# Patient Record
Sex: FEMALE | Race: WHITE | NOT HISPANIC OR LATINO | Employment: OTHER | URBAN - METROPOLITAN AREA
[De-identification: names, ages, dates, MRNs, and addresses within clinical notes are randomized per-mention and may not be internally consistent; named-entity substitution may affect disease eponyms.]

---

## 2021-03-01 LAB — HBA1C MFR BLD HPLC: 6.3 %

## 2021-05-07 ENCOUNTER — OFFICE VISIT (OUTPATIENT)
Dept: ENDOCRINOLOGY | Facility: HOSPITAL | Age: 81
End: 2021-05-07
Payer: MEDICARE

## 2021-05-07 VITALS
WEIGHT: 168.4 LBS | HEART RATE: 70 BPM | DIASTOLIC BLOOD PRESSURE: 72 MMHG | BODY MASS INDEX: 30.99 KG/M2 | HEIGHT: 62 IN | SYSTOLIC BLOOD PRESSURE: 120 MMHG

## 2021-05-07 DIAGNOSIS — E21.3 HYPERPARATHYROIDISM (HCC): ICD-10-CM

## 2021-05-07 DIAGNOSIS — E83.52 HYPERCALCEMIA: Primary | ICD-10-CM

## 2021-05-07 PROCEDURE — 99204 OFFICE O/P NEW MOD 45 MIN: CPT | Performed by: INTERNAL MEDICINE

## 2021-05-07 RX ORDER — VITAMIN B COMPLEX
1 CAPSULE ORAL DAILY
COMMUNITY

## 2021-05-07 RX ORDER — LOSARTAN POTASSIUM AND HYDROCHLOROTHIAZIDE 12.5; 1 MG/1; MG/1
1 TABLET ORAL DAILY
COMMUNITY
Start: 2021-02-25

## 2021-05-07 RX ORDER — ATORVASTATIN CALCIUM 40 MG/1
40 TABLET, FILM COATED ORAL DAILY
COMMUNITY
Start: 2021-03-11

## 2021-05-07 RX ORDER — ALBUTEROL SULFATE 90 UG/1
2 AEROSOL, METERED RESPIRATORY (INHALATION) EVERY 6 HOURS PRN
COMMUNITY

## 2021-05-07 RX ORDER — FAMOTIDINE 40 MG/1
40 TABLET, FILM COATED ORAL
COMMUNITY
Start: 2021-03-22

## 2021-05-07 RX ORDER — ESOMEPRAZOLE MAGNESIUM 40 MG/1
40 CAPSULE, DELAYED RELEASE ORAL DAILY
COMMUNITY
Start: 2021-03-15

## 2021-05-07 NOTE — PATIENT INSTRUCTIONS
Let's repeat the blood work with some extra blood work since you are off calcium now  I'll try to get the dexa scan  Drink plenty of water  We may need to do more testing after this blood work    Follow up to be determined

## 2021-05-07 NOTE — PROGRESS NOTES
5/7/2021    Assessment/Plan      Diagnoses and all orders for this visit:    Hypercalcemia  -     Comprehensive metabolic panel Lab Collect  -     Phosphorus Lab Collect  -     Magnesium Lab Collect  -     PTH, intact Lab Collect Lab Collect  -     Vitamin D 25 hydroxy Lab Collect  -     TSH, 3rd generation Lab Collect  -     T4, free Lab Collect    Hyperparathyroidism (HCC)  -     Comprehensive metabolic panel Lab Collect  -     Phosphorus Lab Collect  -     Magnesium Lab Collect  -     PTH, intact Lab Collect Lab Collect  -     Vitamin D 25 hydroxy Lab Collect  -     TSH, 3rd generation Lab Collect  -     T4, free Lab Collect    Other orders  -     esomeprazole (NexIUM) 40 MG capsule; Take 40 mg by mouth daily   -     losartan-hydrochlorothiazide (HYZAAR) 100-12 5 MG per tablet; Take 1 tablet by mouth daily   -     b complex vitamins capsule; Take 1 capsule by mouth daily  -     Breo Ellipta 200-25 MCG/INH inhaler; Inhale 1 puff daily   -     famotidine (PEPCID) 40 MG tablet; Take 40 mg by mouth daily at bedtime   -     atorvastatin (LIPITOR) 40 mg tablet; Take 40 mg by mouth daily   -     albuterol (PROVENTIL HFA,VENTOLIN HFA) 90 mcg/act inhaler; Inhale 2 puffs every 6 (six) hours as needed for wheezing        Assessment/Plan:    1  Hypercalcemia with hyperparathyroidism  She did have mild hypercalcemia with an elevated parathyroid hormone level  Calcium tablets have since been discontinued  She has not had a vitamin-D level checked so I will do some blood work now  Depending on what the blood work says, we may have to further evaluate the parathyroid by doing a 24 hour urine, renal ultrasound, and perhaps a sestamibi scan  I will try to get her previous DEXA scans from [facility]       Of note, I have reassured her that her left-sided ear symptoms are not related to her parathyroid or her thyroid  I believe it is more related to the sternocleidomastoid muscle    She could also have wax  Buildup in her ear causing her decrease in hearing and ear clogging sensation  She will get blood work done now consisting of a CMP, phosphorus, magnesium, 25 hydroxy vitamin-D level, intact parathyroid hormone level, TSH, and free T4       CC:   Hypercalcemia, hyperparathyroidism consult    History of Present Illness     HPI: Ene Russell is a 78y o  year old female with history of  Hypercalcemia with elevated parathyroid hormone level here for evaluation /consult  She reports that she was found on routine blood work by her primary care physician to have an elevated calcium level  She was told to follow-up with endocrinology  Of note, she was on calcium tablets that were discontinued several months ago because of the elevated calcium  She has a history of being on a multivitamin / Peyman Racer tall but ran out last week  She has never been on vitamin-D replacement  She does have a history of breast cancer and was on Prolia injections along with an estrogen receptor blocker up until February 2021 which were both discontinued by her oncologist   She has reportedly had a DEXA scan showing osteopenia/ osteoporosis but I do not have those results  She denies height loss or renal stones  She does not get outside that much in the sun other than working in the garden the nice weather  She does not exercise as she uses a cane to walk  Dietary calcium intake includes no milk or yogurt, very little cheese, and occasional ice cream or dark vegetables  She has polyuria but no polydipsia  She denies nocturia  She denies constipation or heartburn  She denies fatigue  She denies mental confusion  She denies back pain  She denies perioral paresthesias or extremity paresthesias  She denies muscle weakness  She reports that she is postmenopausal but had a hysterectomy at a young age  She was never on estrogen replacement therapy  She has never been on steroids      Of note, she also complains of left-sided ear pain, it is primarily posterior tear here in radiates down her neck right along her sternocleidomastoid muscle  She does sometimes have some decrease in hearing in that left ear and it feels clogged at times  Review of Systems   Constitutional: Negative for fatigue and unexpected weight change  Will fall asleep if idle  HENT: Positive for ear pain and hearing loss  Negative for tinnitus and trouble swallowing  No XRT to the head or neck in the past    Eyes: Negative for visual disturbance  Wears glasses  No diplopia  Respiratory: Positive for shortness of breath  Negative for chest tightness  SOB with walking fast    Cardiovascular: Positive for leg swelling  Negative for chest pain and palpitations  Right leg edema, wears compression socks  Gastrointestinal: Negative for abdominal pain, constipation, diarrhea and nausea  No heartburn with meds  Endocrine: Positive for polyuria  Negative for polydipsia  No nocturia  Occasional polyuria  Genitourinary:        Postmenopausal, had hysterectomy about 50 years ago  Was on 5 years estrogen blocker for breast cancer and recently discontinued  Musculoskeletal: Negative for arthralgias and back pain  Uses a cane to walk  Skin: Negative for rash and wound  Neurological: Positive for dizziness and headaches  Negative for tremors, weakness, light-headedness and numbness  No numbness or tingling around the mouth  Occasionally leg gives out if walks too fast  Rare headache  History of dizzy spells not recently  Psychiatric/Behavioral: Negative for confusion, dysphoric mood and sleep disturbance  The patient is not nervous/anxious          Historical Information   Past Medical History:   Diagnosis Date    Breast cancer ([facility] [address] )     right, did XRT and surgery and took 5 years of estrogen blocker    CKD (chronic kidney disease) stage 3, GFR 30-59 ml/min (HCC)     Colon polyps     COPD (chronic obstructive pulmonary disease) (HCC)     Fatty liver     GERD (gastroesophageal reflux disease)     Hyperlipidemia     Hypertension     Lymphedema of arm     right    Osteoporosis     was on prolia and now off it per Dr Juliane Miller  Past Surgical History:   Procedure Laterality Date    BREAST LUMPECTOMY Right     with AND    PARTIAL HYSTERECTOMY       Social History   Social History     Substance and Sexual Activity   Alcohol Use Never    Frequency: Never     Social History     Substance and Sexual Activity   Drug Use Never     Social History     Tobacco Use   Smoking Status Former Smoker    Types: Cigarettes    Quit date: 2011    Years since quitting: 10 3   Smokeless Tobacco Never Used     Family History:   Family History   Problem Relation Age of Onset    Heart disease Mother     Stroke Mother     Hypertension Mother     Colon cancer Father     Diabetes type II Sister     Hypertension Sister     Hyperlipidemia Sister     Heart disease Brother     Heart disease Daughter     No Known Problems Son     No Known Problems Son     No Known Problems Son     No Known Problems Son     No Known Problems Brother     Mental retardation Brother        Meds/Allergies   Current Outpatient Medications   Medication Sig Dispense Refill    albuterol (PROVENTIL HFA,VENTOLIN HFA) 90 mcg/act inhaler Inhale 2 puffs every 6 (six) hours as needed for wheezing      atorvastatin (LIPITOR) 40 mg tablet Take 40 mg by mouth daily       b complex vitamins capsule Take 1 capsule by mouth daily      Breo Ellipta 200-25 MCG/INH inhaler Inhale 1 puff daily       esomeprazole (NexIUM) 40 MG capsule Take 40 mg by mouth daily       famotidine (PEPCID) 40 MG tablet Take 40 mg by mouth daily at bedtime       losartan-hydrochlorothiazide (HYZAAR) 100-12 5 MG per tablet Take 1 tablet by mouth daily        No current facility-administered medications for this visit        Allergies   Allergen Reactions    Pollen Extract Other (See Comments)       Objective   Vitals: Blood pressure 120/72, pulse 70, height 5' 2" (1 575 m), weight 76 4 kg (168 lb 6 4 oz)  Invasive Devices     None                 Physical Exam  Vitals signs reviewed  Constitutional:       Appearance: Normal appearance  She is well-developed  HENT:      Head: Normocephalic and atraumatic  Eyes:      Extraocular Movements: Extraocular movements intact  Conjunctiva/sclera: Conjunctivae normal       Comments:  No lid lag, stare, proptosis, or periorbital edema  Neck:      Musculoskeletal: Normal range of motion and neck supple  Thyroid: No thyromegaly  Vascular: No carotid bruit  Comments: Tender over the left SCM muscle  Thyroid normal in size without palpable thyroid nodules  No masses of the neck  No bruits over the thyroid gland  Cardiovascular:      Rate and Rhythm: Normal rate and regular rhythm  Heart sounds: Normal heart sounds  No murmur  Pulmonary:      Effort: Pulmonary effort is normal       Breath sounds: Normal breath sounds  No wheezing  Abdominal:      General: Bowel sounds are normal       Palpations: Abdomen is soft  Tenderness: There is no abdominal tenderness  Musculoskeletal:         General: No deformity  Right lower leg: No edema  Left lower leg: No edema  Comments:   No tremor of the outstretched hands  No spinous process tenderness  No CVA tenderness  Lymphadenopathy:      Cervical: No cervical adenopathy  Skin:     General: Skin is warm and dry  Findings: No rash  Neurological:      Mental Status: She is alert and oriented to person, place, and time  Deep Tendon Reflexes: Reflexes are normal and symmetric  Comments:   Deep tendon reflexes normal          The history was obtained from the review of the chart and from the patient  Lab Results:        Blood work done on 03/01/2021 showed a calcium of 10 5 with an intact parathyroid hormone level of 193  TSH is 2 21  CMPs over the last year have shown both fluctuations and calcium from 10 5-11 2  Albumin has been stable from 4 2-4 5 during this time  No future appointments

## 2021-05-28 DIAGNOSIS — E21.3 HYPERPARATHYROIDISM (HCC): Primary | ICD-10-CM

## 2021-05-28 DIAGNOSIS — E83.52 HYPERCALCEMIA: ICD-10-CM

## 2021-06-25 ENCOUNTER — TELEPHONE (OUTPATIENT)
Dept: ENDOCRINOLOGY | Facility: HOSPITAL | Age: 81
End: 2021-06-25

## 2021-06-28 ENCOUNTER — OFFICE VISIT (OUTPATIENT)
Dept: ENDOCRINOLOGY | Facility: HOSPITAL | Age: 81
End: 2021-06-28
Payer: MEDICARE

## 2021-06-28 VITALS
WEIGHT: 170 LBS | DIASTOLIC BLOOD PRESSURE: 70 MMHG | HEART RATE: 68 BPM | BODY MASS INDEX: 31.28 KG/M2 | HEIGHT: 62 IN | SYSTOLIC BLOOD PRESSURE: 142 MMHG

## 2021-06-28 DIAGNOSIS — E83.52 HYPERCALCEMIA: ICD-10-CM

## 2021-06-28 DIAGNOSIS — E21.3 HYPERPARATHYROIDISM (HCC): Primary | ICD-10-CM

## 2021-06-28 PROCEDURE — 99214 OFFICE O/P EST MOD 30 MIN: CPT | Performed by: INTERNAL MEDICINE

## 2021-06-28 RX ORDER — CINACALCET 30 MG/1
30 TABLET, FILM COATED ORAL DAILY
Qty: 30 TABLET | Refills: 6 | Status: SHIPPED | OUTPATIENT
Start: 2021-06-28 | End: 2022-04-28

## 2021-06-28 NOTE — PATIENT INSTRUCTIONS
The calcium and parathyroid are very high  Surgery will sure it but needs to be done at [place] or in the [facility] or [address] [facility]  We may be able to control the calcium with a pill, sensipar 30 mg once daily  Call me if the medicine is too expensive  Drink water 8 ounces 6-8 glasses a day  Follow up 3 months with blood work

## 2021-06-28 NOTE — PROGRESS NOTES
6/28/2021    Assessment/Plan      Diagnoses and all orders for this visit:    Hyperparathyroidism ([address] )  -     cinacalcet (SENSIPAR) 30 mg tablet; Take 1 tablet (30 mg total) by mouth daily  -     Comprehensive metabolic panel Lab Collect; Future  -     Phosphorus Lab Collect; Future  -     PTH, intact Lab Collect Lab Collect; Future    Hypercalcemia  -     cinacalcet (SENSIPAR) 30 mg tablet; Take 1 tablet (30 mg total) by mouth daily  -     Comprehensive metabolic panel Lab Collect; Future  -     Phosphorus Lab Collect; Future  -     PTH, intact Lab Collect Lab Collect; Future        Assessment/Plan:   1  Hypercalcemia  She has marked hypercalcemia with a calcium of 11 5 which is over 1 mg/dL above the upper limit of normal   She has minor symptoms such as fatigue and polyuria     The hypercalcemia is on the basis of hyperparathyroidism  2  Hyperparathyroidism  She has hypercalcemia due to hyperparathyroidism with a parathyroid hormone level of 150  Calcium level is 11 5   24 hour urine calcium is normal and ultrasound of the kidneys demonstrates no evidence of renal stones  She does have a history of a worsening DEXA scan  She is due for repeat DEXA scan in the fall 2021  I had a discussion with her regarding her symptomatology, test results and treatment options  The most definitive treatment option is surgical removal of the parathyroid gland  She is not yet ready to undergo surgery at this point with her age and medical conditions  We will try to control her calcium utilizing 6-8 8 oz glasses of water consume to day and Sensipar 30 mg daily  She was asked to call if the Sensipar is too expensive and then we will have to refer her to a surgeon and have a sestamibi scan performed  I have asked her to follow up in 3 months with preceding CMP, phosphorus, and intact parathyroid hormone level        The entirety of the visit was spent in discussion regarding her testing results and treatment options  CC:   Hypercalcemia and hyperparathyroid follow-up    History of Present Illness     HPI: Kyler Membreno is a 78y o  year old female with history of  Hypercalcemia due to hyperparathyroidism he for follow-up visit  She is here to discuss blood work results and treatment options  Review of Systems  was not performed as she was here to discuss blood work results and treatment options  Historical Information   Past Medical History:   Diagnosis Date    Breast cancer ([address] )     right, did XRT and surgery and took 5 years of estrogen blocker    CKD (chronic kidney disease) stage 3, GFR 30-59 ml/min (HCC)     Colon polyps     COPD (chronic obstructive pulmonary disease) (HCC)     Fatty liver     GERD (gastroesophageal reflux disease)     Hyperlipidemia     Hypertension     Lymphedema of arm     right    Osteoporosis     was on prolia and now off it per Dr Trey Khanna       Past Surgical History:   Procedure Laterality Date    BREAST LUMPECTOMY Right     with AND    PARTIAL HYSTERECTOMY       Social History   Social History     Substance and Sexual Activity   Alcohol Use Never     Social History     Substance and Sexual Activity   Drug Use Never     Social History     Tobacco Use   Smoking Status Former Smoker    Types: Cigarettes    Quit date: 2011    Years since quitting: 10 4   Smokeless Tobacco Never Used     Family History:   Family History   Problem Relation Age of Onset    Heart disease Mother    Tomie Coop Stroke Mother     Hypertension Mother     Colon cancer Father     Diabetes type II Sister     Hypertension Sister     Hyperlipidemia Sister     Heart disease Brother     Heart disease Daughter     No Known Problems Son     No Known Problems Son     No Known Problems Son     No Known Problems Son     No Known Problems Brother     Mental retardation Brother        Meds/Allergies   Current Outpatient Medications   Medication Sig Dispense Refill    albuterol (Ganesh Yarsani HFA,VENTOLIN HFA) 90 mcg/act inhaler Inhale 2 puffs every 6 (six) hours as needed for wheezing      atorvastatin (LIPITOR) 40 mg tablet Take 40 mg by mouth daily       b complex vitamins capsule Take 1 capsule by mouth daily      Breo Ellipta 200-25 MCG/INH inhaler Inhale 1 puff daily       esomeprazole (NexIUM) 40 MG capsule Take 40 mg by mouth daily       famotidine (PEPCID) 40 MG tablet Take 40 mg by mouth daily at bedtime       losartan-hydrochlorothiazide (HYZAAR) 100-12 5 MG per tablet Take 1 tablet by mouth daily       cinacalcet (SENSIPAR) 30 mg tablet Take 1 tablet (30 mg total) by mouth daily 30 tablet 6     No current facility-administered medications for this visit  Allergies   Allergen Reactions    Pollen Extract Other (See Comments)       Objective   Vitals: Blood pressure 142/70, pulse 68, height 5' 2" (1 575 m), weight 77 1 kg (170 lb)  Invasive Devices     None                 Physical Exam  was not performed as this visit was to discuss testing results and treatment options  The history was obtained from the review of the chart and from the patient and grandson  Lab Results:        Blood work done on 05/13/2021 at [facility] showed a CMP with a calcium of 11 2, albumin is 4 4, glucose 120, BUN/creatinine ratio 30 7, but was otherwise normal  Phosphorus is 3 5  Magnesium is 1 9  PTH level is 155  Twenty-five hydroxy vitamin-D level is 30 8  TSH is 2 42 with a free T4 of 1 07     24 hour urine calcium is 108  1  Renal ultrasound:     Renal ultrasound performed at [facility] on 06/14/2021 showed no renal calculi, just a small cyst attached to the inferior portion of the left kidney        Future Appointments   Date Time Provider [place]   10/4/2021  1:20 PM Piper Stevenson MD [facility]

## 2021-06-29 ENCOUNTER — TELEPHONE (OUTPATIENT)
Dept: ENDOCRINOLOGY | Facility: HOSPITAL | Age: 81
End: 2021-06-29

## 2021-06-29 NOTE — TELEPHONE ENCOUNTER
Patients Sensipar needs a prior auth       Rx [facility]  [id number]  Rx [address]    ID # [id number]

## 2021-07-26 ENCOUNTER — TELEPHONE (OUTPATIENT)
Dept: ENDOCRINOLOGY | Facility: HOSPITAL | Age: 81
End: 2021-07-26

## 2021-07-26 NOTE — TELEPHONE ENCOUNTER
Blood work shows a normal calcium although it is high normal   The rest of her blood work is normal   Her parathyroid hormone level remains high though at 166 but that is stable

## 2021-08-27 ENCOUNTER — TELEPHONE (OUTPATIENT)
Dept: ENDOCRINOLOGY | Facility: HOSPITAL | Age: 81
End: 2021-08-27

## 2021-08-27 NOTE — TELEPHONE ENCOUNTER
Could she retry the cinicalcet 3 times a week  Make sure to be off the medicine at least 2 weeks before trying  If it still causes these symptoms then we may have to discuss surgery

## 2021-08-27 NOTE — TELEPHONE ENCOUNTER
Loreta Batista called bc she tried the cinacalcet for the month of July and she said they made her sick  She almost took all 30 pills but she had to lay down after she took them with breakfast and they started making her gag  She said they were making her poop a lot too

## 2021-10-04 ENCOUNTER — OFFICE VISIT (OUTPATIENT)
Dept: ENDOCRINOLOGY | Facility: HOSPITAL | Age: 81
End: 2021-10-04
Payer: MEDICARE

## 2021-10-04 VITALS
DIASTOLIC BLOOD PRESSURE: 80 MMHG | HEIGHT: 62 IN | BODY MASS INDEX: 30.77 KG/M2 | WEIGHT: 167.2 LBS | HEART RATE: 82 BPM | SYSTOLIC BLOOD PRESSURE: 148 MMHG

## 2021-10-04 DIAGNOSIS — E83.52 HYPERCALCEMIA: ICD-10-CM

## 2021-10-04 DIAGNOSIS — E21.3 HYPERPARATHYROIDISM (HCC): Primary | ICD-10-CM

## 2021-10-04 PROCEDURE — 99214 OFFICE O/P EST MOD 30 MIN: CPT | Performed by: PHYSICIAN ASSISTANT

## 2022-02-17 ENCOUNTER — TELEPHONE (OUTPATIENT)
Dept: ENDOCRINOLOGY | Facility: HOSPITAL | Age: 82
End: 2022-02-17

## 2022-02-17 NOTE — TELEPHONE ENCOUNTER
I would like to know this information first  But yes she likely needs a follow up appointment to discuss

## 2022-02-18 ENCOUNTER — TELEPHONE (OUTPATIENT)
Dept: HEMATOLOGY ONCOLOGY | Facility: CLINIC | Age: 82
End: 2022-02-18

## 2022-02-18 NOTE — TELEPHONE ENCOUNTER
Pt called back  She has not been taking the Sensipar because it was making her sick, nauseous  She said she finished 1 bottle and then stopped taking so she has not been taking it since last summer

## 2022-02-18 NOTE — TELEPHONE ENCOUNTER
New Patient Intake Form   Patient Details:    Miguel Lawton  1940  [id number]    Appointment Information   Who is calling to schedule? Patient   If not self, what is the caller's name? Please put name of [facility] nurse as well  Referring provider Evelyn Vela MD   What is the diagnosis? Hyperparathyroidism     Is there a confirmed tissue diagnosis? No   Is there a biopsy ordered or pending? Please specify dates   n/a     Is patient aware of diagnosis? Yes   Have you had any imaging or labs done? If yes, where? (If imaging done outside of [facility], please remind patient to bring a disk )   Yes     02/16     If imaging done at outside facility, did you instruct patient to obtain discs and bring to visit? n/a   Have you been seen by another Oncologist/Hematologist?  If so, who and where? no   Are the records in [facility] or [facility] Everywhere? yes   Does the patient have records at another facility/hospital? no   If yes, Name of facility, city and state where facility is located  Did you instruct patient to have records faxed to [facility] and provide [facility] number? n/a   Preferred [place]   Is the patient willing to be seen by another provider?   (This is for breast patients only) no   Miscellaneous Information: appt made for 03/25

## 2022-02-18 NOTE — TELEPHONE ENCOUNTER
If she can not tolerate the sensipar then we need to get her to a parathyroid surgeon as the calcium is going up very high and she is at risk for coma

## 2022-03-25 ENCOUNTER — CONSULT (OUTPATIENT)
Dept: SURGICAL ONCOLOGY | Facility: CLINIC | Age: 82
End: 2022-03-25
Payer: COMMERCIAL

## 2022-03-25 VITALS
TEMPERATURE: 97 F | WEIGHT: 155.4 LBS | BODY MASS INDEX: 28.6 KG/M2 | DIASTOLIC BLOOD PRESSURE: 82 MMHG | SYSTOLIC BLOOD PRESSURE: 134 MMHG | HEIGHT: 62 IN

## 2022-03-25 DIAGNOSIS — E83.52 HYPERCALCEMIA: ICD-10-CM

## 2022-03-25 DIAGNOSIS — E21.3 HYPERPARATHYROIDISM (HCC): Primary | ICD-10-CM

## 2022-03-25 PROCEDURE — 99203 OFFICE O/P NEW LOW 30 MIN: CPT | Performed by: SURGERY

## 2022-03-25 NOTE — LETTER
March 25, 2022     [facility]  [address] [address],[address]  [address] [address] [address]    Patient: Tyler Aragon   YOB: 1940   Date of Visit: 3/25/2022       Dear Dr Maxx Vizcarra:    Thank you for referring Tyler Aragon to me for evaluation  Below are my notes for this consultation  If you have questions, please do not hesitate to call me  I look forward to following your patient along with you  Sincerely,        Sathya Menard MD        CC: DO Shahana Gautam PA-C Ruddy Sick, MD  3/25/2022 11:19 AM  Sign when Signing Visit               Surgical Oncology Consult       [address] SURGICAL ONCOLOGY [facility] [address] [address]    Tyler Aragon  [birth year]  [id number]  [facility]  CANCER CARE [facility] SURGICAL ONCOLOGY [facility] [address] [address]    No chief complaint on file  Assessment/Plan:    No problem-specific Assessment & Plan notes found for this encounter  Diagnoses and all orders for this visit:    Hyperparathyroidism [facility])    Hypercalcemia        Advance Care Planning/Advance Directives:  Discussed disease status, cancer treatment plans and/or cancer treatment goals with the patient  Oncology History    No history exists  History of Present Illness: 78year old woman with hypercalcemia, started on sensipar, though she didn't tolerate well  She has been referred for evaluation and treatment  She denies fatigue, achiness, or GI complaints  Review of Systems   Constitutional: Negative  HENT: Negative  Eyes: Negative  Respiratory: Negative  Cardiovascular: Negative  Gastrointestinal: Negative  Endocrine: Negative  Genitourinary: Negative  Musculoskeletal: Negative  Skin: Negative  Allergic/Immunologic: Negative  Neurological: Negative  Hematological: Negative  Psychiatric/Behavioral: Negative  Patient Active Problem List   Diagnosis    Hypercalcemia    Hyperpar[facility]roidism ([facility] [address] )     Past Medical History:   Diagnosis Date    [facility]     right, did XRT and surgery and took 5 years of estrogen blocker    CKD (chronic kidney disease) stage 3, GFR 30-59 ml/min (HCC)     Colon polyps     COPD (chronic obstructive pulmonary disease) (HCC)     Fatty liver     GERD (gastroesophageal reflux disease)     Hyperlipidemia     Hypertension     Lymphedema of arm     right    Osteoporosis     was on prolia and now off it per Dr Alyson Rojo  Past Surgical History:   Procedure Laterality Date    BREAST LUMPECTOMY Right     with AND    PARTIAL HYSTERECTOMY       Family History   Problem Relation Age of Onset    Heart disease Mother    Karthik Angulo Stroke Mother     Hypertension Mother     Colon cancer Father     Diabetes type II Sister     Hypertension Sister     Hyperlipidemia Sister     Heart disease Brother     Heart disease Daughter     No Known Problems Son     No Known Problems Son     No Known Problems Son     No Known Problems Son     No Known Problems Brother     Mental retardation Brother      Social History     Socioeconomic History    Marital status:       Spouse name: Not on file    Number of children: Not on file    Years of education: Not on file    Highest education level: Not on file   Occupational History    Not on file   Tobacco Use    Smoking status: Former Smoker     Types: Cigarettes     Quit date:      Years since quittin 2    Smokeless tobacco: Never Used   Vaping Use    Vaping Use: Never used   Substance and Sexual Activity    Alcohol use: Never    Drug use: Never    Sexual activity: Not on file   Other Topics Concern    Not on file   Social History Narrative    Not on file     Social Determinants of Health     Financial Resource Strain: Not on file   Food Insecurity: Not on file   Transportation Needs: Not on file   Physical Activity: Not on file   Stress: Not on file   Social Connections: Not on file   Intimate Partner Violence: Not on file   Housing Stability: Not on file       Current Outpatient Medications:     albuterol (PROVENTIL HFA,VENTOLIN HFA) 90 mcg/act inhaler, Inhale 2 puffs every 6 (six) hours as needed for wheezing, Disp: , Rfl:     atorvastatin (LIPITOR) 40 mg tablet, Take 40 mg by mouth daily , Disp: , Rfl:     b complex vitamins capsule, Take 1 capsule by mouth daily, Disp: , Rfl:     Breo Ellipta 200-25 MCG/INH inhaler, Inhale 1 puff daily , Disp: , Rfl:     cinacalcet (SENSIPAR) 30 mg tablet, Take 1 tablet (30 mg total) by mouth daily, Disp: 30 tablet, Rfl: 6    esomeprazole (NexIUM) 40 MG capsule, Take 40 mg by mouth daily , Disp: , Rfl:     famotidine (PEPCID) 40 MG tablet, Take 40 mg by mouth daily at bedtime , Disp: , Rfl:     losartan-hydrochlorothiazide (HYZAAR) 100-12 5 MG per tablet, Take 1 tablet by mouth daily , Disp: , Rfl:   Allergies   Allergen Reactions    Pollen Extract Other (See Comments)     Vitals:    03/25/22 1055   BP: 134/82   Temp: (!) 97 °F (36 1 °C)       Physical Exam  Constitutional:       Appearance: Normal appearance  HENT:      Head: Normocephalic and atraumatic  Right Ear: External ear normal       Left Ear: External ear normal       Nose: Nose normal    Eyes:      Extraocular Movements: Extraocular movements intact  Pupils: Pupils are equal, round, and reactive to light  Cardiovascular:      Rate and Rhythm: Normal rate and regular rhythm  Heart sounds: Normal heart sounds  Pulmonary:      Effort: Pulmonary effort is normal       Breath sounds: Normal breath sounds  Abdominal:      General: Abdomen is flat  Palpations: Abdomen is soft  Musculoskeletal:      Cervical back: Normal range of motion and neck supple  Skin:     General: Skin is warm and dry  Neurological:      General: No focal deficit present        Mental Status: She is alert and oriented to person, place, and time  Psychiatric:         Mood and Affect: Mood normal          Behavior: Behavior normal          Pathology:  none    Labs: 2/16/22  Ca: 12 1  PTH: 103    Imaging  No results found  I reviewed the above laboratory and imaging data  Discussion/Summary: Primary hyperparathyroidism  Will schedule for Sestamibi in anticipation for parathyroidectomy  All questions answered

## 2022-03-25 NOTE — PROGRESS NOTES
Surgical Oncology Consult       [facility] SURGICAL ONCOLOGY [facility] [address]    Denise Gastelum  0/53/8196  [id number]  [address] [facility]  [address] [address]    No chief complaint on file  Assessment/Plan:    No problem-specific Assessment & Plan notes found for this encounter  Diagnoses and all orders for this visit:    Hyperparathyroidism [facility])    Hypercalcemia        Advance Care Planning/Advance Directives:  Discussed disease status, cancer treatment plans and/or cancer treatment goals with the patient  Oncology History    No history exists  History of Present Illness: 78year old woman with hypercalcemia, started on sensipar, though she didn't tolerate well  She has been referred for evaluation and treatment  She denies fatigue, achiness, or GI complaints  Review of Systems   Constitutional: Negative  HENT: Negative  Eyes: Negative  Respiratory: Negative  Cardiovascular: Negative  Gastrointestinal: Negative  Endocrine: Negative  Genitourinary: Negative  Musculoskeletal: Negative  Skin: Negative  Allergic/Immunologic: Negative  Neurological: Negative  Hematological: Negative  Psychiatric/Behavioral: Negative  Patient Active Problem List   Diagnosis    Hypercalcemia    Hyperparathyroidism ([address] )     Past Medical History:   Diagnosis Date    Breast cancer ([address] )     right, did XRT and surgery and took 5 years of estrogen blocker    CKD (chronic kidney disease) stage 3, GFR 30-59 ml/min (HCC)     Colon polyps     COPD (chronic obstructive pulmonary disease) (HCC)     Fatty liver     GERD (gastroesophageal reflux disease)     Hyperlipidemia     Hypertension     Lymphedema of arm     right    Osteoporosis     was on prolia and now off it per Dr Nowell Brunner       Past Surgical History: Procedure Laterality Date    BREAST LUMPECTOMY Right     with AND    PARTIAL HYSTERECTOMY       Family History   Problem Relation Age of Onset    Heart disease Mother    [place] Stroke Mother     Hypertension Mother     Colon cancer Father     Diabetes type II Sister     Hypertension Sister     Hyperlipidemia Sister     Heart disease Brother     Heart disease Daughter     No Known Problems Son     No Known Problems Son     No Known Problems Son     No Known Problems Son     No Known Problems Brother     Mental retardation Brother      Social History     Socioeconomic History    Marital status:       Spouse name: Not on file    Number of children: Not on file    Years of education: Not on file    Highest education level: Not on file   Occupational History    Not on file   Tobacco Use    Smoking status: Former Smoker     Types: Cigarettes     Quit date:      Years since quittin 2    Smokeless tobacco: Never Used   Vaping Use    Vaping Use: Never used   Substance and Sexual Activity    Alcohol use: Never    Drug use: Never    Sexual activity: Not on file   Other Topics Concern    Not on file   Social History Narrative    Not on file     Social Determinants of Health     Financial Resource Strain: Not on file   Food Insecurity: Not on file   Transportation Needs: Not on file   Physical Activity: Not on file   Stress: Not on file   Social Connections: Not on file   Intimate Partner Violence: Not on file   Housing Stability: Not on file       Current Outpatient Medications:     albuterol (PROVENTIL HFA,VENTOLIN HFA) 90 mcg/act inhaler, Inhale 2 puffs every 6 (six) hours as needed for wheezing, Disp: , Rfl:     atorvastatin (LIPITOR) 40 mg tablet, Take 40 mg by mouth daily , Disp: , Rfl:     b complex vitamins capsule, Take 1 capsule by mouth daily, Disp: , Rfl:     Breo Ellipta 200-25 MCG/INH inhaler, Inhale 1 puff daily , Disp: , Rfl:     cinacalcet (SENSIPAR) 30 mg tablet, Take 1 tablet (30 mg total) by mouth daily, Disp: 30 tablet, Rfl: 6    esomeprazole (NexIUM) 40 MG capsule, Take 40 mg by mouth daily , Disp: , Rfl:     famotidine (PEPCID) 40 MG tablet, Take 40 mg by mouth daily at bedtime , Disp: , Rfl:     losartan-hydrochlorothiazide (HYZAAR) 100-12 5 MG per tablet, Take 1 tablet by mouth daily , Disp: , Rfl:   Allergies   Allergen Reactions    Pollen Extract Other (See Comments)     Vitals:    03/25/22 1055   BP: 134/82   Temp: (!) 97 °F (36 1 °C)       Physical Exam  Constitutional:       Appearance: Normal appearance  HENT:      Head: Normocephalic and atraumatic  Right Ear: External ear normal       Left Ear: External ear normal       Nose: Nose normal    Eyes:      Extraocular Movements: Extraocular movements intact  Pupils: Pupils are equal, round, and reactive to light  Cardiovascular:      Rate and Rhythm: Normal rate and regular rhythm  Heart sounds: Normal heart sounds  Pulmonary:      Effort: Pulmonary effort is normal       Breath sounds: Normal breath sounds  Abdominal:      General: Abdomen is flat  Palpations: Abdomen is soft  Musculoskeletal:      Cervical back: Normal range of motion and neck supple  Skin:     General: Skin is warm and dry  Neurological:      General: No focal deficit present  Mental Status: She is alert and oriented to person, place, and time  Psychiatric:         Mood and Affect: Mood normal          Behavior: Behavior normal          Pathology:  none    Labs: 2/16/22  Ca: 12 1  PTH: 103    Imaging  No results found  I reviewed the above laboratory and imaging data  Discussion/Summary: Primary hyperparathyroidism  Will schedule for Sestamibi in anticipation for parathyroidectomy  All questions answered

## 2022-03-29 ENCOUNTER — TELEPHONE (OUTPATIENT)
Dept: HEMATOLOGY ONCOLOGY | Facility: CLINIC | Age: 82
End: 2022-03-29

## 2022-03-29 NOTE — TELEPHONE ENCOUNTER
Appointment Confirmation (to confirm pre existing appointments - ONLY)     Appointment with  Dr Felisha Esposito   Appointment date & time  4/20/22 2:15 pm   Location [place]   Patient verbilized Understanding  yes

## 2022-04-05 ENCOUNTER — HOSPITAL ENCOUNTER (OUTPATIENT)
Dept: NUCLEAR MEDICINE | Facility: HOSPITAL | Age: 82
Discharge: HOME | End: 2022-04-05
Attending: SURGERY
Payer: COMMERCIAL

## 2022-04-05 DIAGNOSIS — E21.3 HYPERPARATHYROIDISM (HCC): ICD-10-CM

## 2022-04-05 PROCEDURE — 78071 PARATHYRD PLANAR W/WO SUBTRJ: CPT

## 2022-04-05 PROCEDURE — A9500 TC99M SESTAMIBI: HCPCS

## 2022-04-05 PROCEDURE — G1004 CDSM NDSC: HCPCS

## 2022-04-19 ENCOUNTER — TELEPHONE (OUTPATIENT)
Dept: SURGICAL ONCOLOGY | Facility: CLINIC | Age: 82
End: 2022-04-19

## 2022-04-22 ENCOUNTER — OFFICE VISIT (OUTPATIENT)
Dept: SURGICAL ONCOLOGY | Facility: CLINIC | Age: 82
End: 2022-04-22
Payer: COMMERCIAL

## 2022-04-22 VITALS
HEART RATE: 66 BPM | SYSTOLIC BLOOD PRESSURE: 138 MMHG | TEMPERATURE: 97.3 F | RESPIRATION RATE: 17 BRPM | DIASTOLIC BLOOD PRESSURE: 64 MMHG | OXYGEN SATURATION: 95 % | WEIGHT: 158.2 LBS | HEIGHT: 62 IN | BODY MASS INDEX: 29.11 KG/M2

## 2022-04-22 DIAGNOSIS — E21.3 HYPERPARATHYROIDISM (HCC): ICD-10-CM

## 2022-04-22 DIAGNOSIS — E83.52 HYPERCALCEMIA: Primary | ICD-10-CM

## 2022-04-22 PROCEDURE — 99214 OFFICE O/P EST MOD 30 MIN: CPT | Performed by: SURGERY

## 2022-04-22 RX ORDER — TRAMADOL HYDROCHLORIDE 50 MG/1
50 TABLET ORAL EVERY 6 HOURS PRN
Qty: 10 TABLET | Refills: 0 | Status: SHIPPED | OUTPATIENT
Start: 2022-04-22 | End: 2022-06-30 | Stop reason: ALTCHOICE

## 2022-04-22 NOTE — PROGRESS NOTES
Surgical Oncology Follow Up       [address] [facility] SURGICAL ONCOLOGY PATIENCEKALIN Kang  [facility] [address]    Sarah Blankenship  0/64/5942  [id number]  [address]  [facility] SURGICAL ONCOLOGY [place]  [address] [address]    Chief Complaint   Patient presents with    Follow-up       Assessment/Plan:    No problem-specific Assessment & Plan notes found for this encounter  Diagnoses and all orders for this visit:    Hypercalcemia    Hyperparathyroidism [facility])        Advance Care Planning/Advance Directives:  Discussed disease status, cancer treatment plans and/or cancer treatment goals with the patient  Oncology History    No history exists  History of Present Illness:  Patient is an 55-year-old woman here for follow-up status post sestamibi scan look for potential parathyroid adenoma   -Interval History:  No new complaints since her last visit  Review of Systems:  Review of Systems   Constitutional: Positive for fatigue  HENT: Negative  Eyes: Negative  Respiratory: Negative  Cardiovascular: Negative  Gastrointestinal: Negative  Endocrine: Negative  Genitourinary: Negative  Musculoskeletal: Positive for arthralgias and myalgias  Skin: Negative  Allergic/Immunologic: Negative  Neurological: Negative  Hematological: Negative  Psychiatric/Behavioral: Negative          Patient Active Problem List   Diagnosis    Hypercalcemia    Hyperparathyroidism [facility])     Past Medical History:   Diagnosis Date    Breast cancer ([address] )     right, did XRT and surgery and took 5 years of estrogen blocker    CKD (chronic kidney disease) stage 3, GFR 30-59 ml/min (HCC)     Colon polyps     COPD (chronic obstructive pulmonary disease) ([address] )     Fatty liver     GERD (gastroesophageal reflux disease)     Hyperlipidemia     Hypertension     Lymphedema of arm     right    Osteoporosis     was on prolia and now off it per Dr Vanessa Hunt  Past Surgical History:   Procedure Laterality Date    BREAST LUMPECTOMY Right     with AND    PARTIAL HYSTERECTOMY       Family History   Problem Relation Age of Onset    Heart disease Mother    [employer] Stroke Mother     Hypertension Mother     Colon cancer Father     Diabetes type II Sister     Hypertension Sister     Hyperlipidemia Sister     Heart disease Brother     Heart disease Daughter     No Known Problems Son     No Known Problems Son     No Known Problems Son     No Known Problems Son     No Known Problems Brother     Mental retardation Brother      Social History     Socioeconomic History    Marital status:       Spouse name: Not on file    Number of children: Not on file    Years of education: Not on file    Highest education level: Not on file   Occupational History    Not on file   Tobacco Use    Smoking status: Former Smoker     Types: Cigarettes     Quit date:      Years since quittin 3    Smokeless tobacco: Never Used   Vaping Use    Vaping Use: Never used   Substance and Sexual Activity    Alcohol use: Never    Drug use: Never    Sexual activity: Not on file   Other Topics Concern    Not on file   Social History Narrative    Not on file     Social Determinants of Health     Financial Resource Strain: Not on file   Food Insecurity: Not on file   Transportation Needs: Not on file   Physical Activity: Not on file   Stress: Not on file   Social Connections: Not on file   Intimate Partner Violence: Not on file   Housing Stability: Not on file       Current Outpatient Medications:     albuterol (PROVENTIL HFA,VENTOLIN HFA) 90 mcg/act inhaler, Inhale 2 puffs every 6 (six) hours as needed for wheezing, Disp: , Rfl:     atorvastatin (LIPITOR) 40 mg tablet, Take 40 mg by mouth daily , Disp: , Rfl:     b complex vitamins capsule, Take 1 capsule by mouth daily, Disp: , Rfl:     Breo Ellipta 200-25 MCG/INH inhaler, Inhale 1 puff daily , Disp: , Rfl:     cinacalcet (SENSIPAR) 30 mg tablet, Take 1 tablet (30 mg total) by mouth daily, Disp: 30 tablet, Rfl: 6    esomeprazole (NexIUM) 40 MG capsule, Take 40 mg by mouth daily , Disp: , Rfl:     famotidine (PEPCID) 40 MG tablet, Take 40 mg by mouth daily at bedtime , Disp: , Rfl:     losartan-hydrochlorothiazide (HYZAAR) 100-12 5 MG per tablet, Take 1 tablet by mouth daily , Disp: , Rfl:     traMADol (Ultram) 50 mg tablet, Take 1 tablet (50 mg total) by mouth every 6 (six) hours as needed for moderate pain, Disp: 10 tablet, Rfl: 0  Allergies   Allergen Reactions    Pollen Extract Other (See Comments)     Vitals:    04/22/22 0940   BP: 138/64   Pulse: 66   Resp: 17   Temp: (!) 97 3 °F (36 3 °C)   SpO2: 95%       Physical Exam  Vitals reviewed  HENT:      Head: Normocephalic and atraumatic  Right Ear: External ear normal       Left Ear: External ear normal       Nose: Nose normal       Mouth/Throat:      Mouth: Mucous membranes are moist    Eyes:      Extraocular Movements: Extraocular movements intact  Pupils: Pupils are equal, round, and reactive to light  Cardiovascular:      Rate and Rhythm: Normal rate and regular rhythm  Heart sounds: Normal heart sounds  Pulmonary:      Effort: Pulmonary effort is normal       Breath sounds: Normal breath sounds  Abdominal:      General: Abdomen is flat  Palpations: Abdomen is soft  Musculoskeletal:         General: Normal range of motion  Cervical back: Normal range of motion and neck supple  No rigidity or tenderness  Right lower leg: No edema  Left lower leg: No edema  Lymphadenopathy:      Cervical: No cervical adenopathy  Skin:     General: Skin is warm and dry  Neurological:      General: No focal deficit present  Mental Status: She is oriented to person, place, and time  Psychiatric:         Mood and Affect: Mood normal          Behavior: Behavior normal          Thought Content:  Thought content normal          Judgment: Judgment normal            Results:  Labs:  No results found for: PTH, CALCIUM, CAION, PHOS      Imaging  NM parathyroid scan w spect    Result Date: 4/5/2022  Narrative: PARATHYROID SCAN INDICATION:  E21 3: Hyperparathyroidism, unspecified COMPARISON:  None  TECHNIQUE:   Following the intravenous administration of 26 6 mCi Tc-99m Cardiolite, anterior and bilateral anterior oblique projection images of the neck and mediastinum were obtained at approximately 10 minutes post injection followed at 2 hours post injection by static anterior and bilateral oblique projections as well as SPECT images in coronal, sagittal and axial projections  FINDINGS: Immediate imaging demonstrates slightly asymmetric nodular tracer activity in the region of the left upper to mid thyroid lobe  Tracer distribution is otherwise physiologic  Delayed imaging demonstrates near complete washout of tracer activity from the thyroid gland with subtle asymmetric persistent tracer activity about the left upper to mid thyroid lobe which is difficult to definitively anatomically localize on SPECT imaging  While I favor findings to be related to incomplete washout of tracer activity from the thyroid gland, parathyroid adenoma versus thyroid adenoma/carcinoma is within the differential diagnosis  Consider correlation with ultrasound as clinically  indicated  Impression: No focal tracer activity characteristic of parathyroid adenoma  However, note is made of mild asymmetric persistent focus of tracer activity about the left upper to mid thyroid lobe of uncertain clinical significance, see discussion above for differential diagnosis which includes subtle parathyroid adenoma  Consider  correlation with ultrasound as clinically indicated  Workstation performed: [id number]     I reviewed the above laboratory and imaging data      Discussion/Summary:  10-year-old woman, primary hyperparathyroidism, suspected left-sided target based on sestamibi scan  She is amenable to and a candidate for minimally invasive parathyroidectomy, possible 4 gland exploration, with intraoperative PTH monitoring  Rationale for this along with risks and benefits of surgery including infection, bleeding, need for possible additional surgery, discussed with her  All questions answered and consent signed at this visit

## 2022-04-25 ENCOUNTER — TELEPHONE (OUTPATIENT)
Dept: ANESTHESIOLOGY | Facility: CLINIC | Age: 82
End: 2022-04-25

## 2022-04-27 PROBLEM — I10 PRIMARY HYPERTENSION: Status: ACTIVE | Noted: 2022-04-27

## 2022-04-27 PROBLEM — E78.49 OTHER HYPERLIPIDEMIA: Status: ACTIVE | Noted: 2022-04-27

## 2022-04-27 PROBLEM — Z01.89 ENCOUNTER FOR GERIATRIC ASSESSMENT: Status: ACTIVE | Noted: 2022-04-27

## 2022-04-28 ENCOUNTER — HOSPITAL ENCOUNTER (OUTPATIENT)
Dept: RADIOLOGY | Facility: HOSPITAL | Age: 82
Discharge: HOME | End: 2022-04-28
Attending: SURGERY
Payer: COMMERCIAL

## 2022-04-28 ENCOUNTER — CONSULT (OUTPATIENT)
Dept: ANESTHESIOLOGY | Facility: CLINIC | Age: 82
End: 2022-04-28
Payer: COMMERCIAL

## 2022-04-28 ENCOUNTER — APPOINTMENT (OUTPATIENT)
Dept: LAB | Facility: HOSPITAL | Age: 82
End: 2022-04-28
Attending: SURGERY
Payer: COMMERCIAL

## 2022-04-28 VITALS
DIASTOLIC BLOOD PRESSURE: 64 MMHG | OXYGEN SATURATION: 92 % | WEIGHT: 155.6 LBS | BODY MASS INDEX: 28.46 KG/M2 | SYSTOLIC BLOOD PRESSURE: 150 MMHG | TEMPERATURE: 97 F | HEART RATE: 69 BPM | RESPIRATION RATE: 17 BRPM

## 2022-04-28 DIAGNOSIS — E83.52 HYPERCALCEMIA: ICD-10-CM

## 2022-04-28 DIAGNOSIS — I10 HYPERTENSION, UNSPECIFIED TYPE: ICD-10-CM

## 2022-04-28 DIAGNOSIS — R54 FRAIL ELDERLY: ICD-10-CM

## 2022-04-28 DIAGNOSIS — N18.9 CHRONIC KIDNEY DISEASE, UNSPECIFIED CKD STAGE: ICD-10-CM

## 2022-04-28 DIAGNOSIS — E88.09 HYPOALBUMINEMIA: ICD-10-CM

## 2022-04-28 DIAGNOSIS — Z01.89 ENCOUNTER FOR GERIATRIC ASSESSMENT: Primary | ICD-10-CM

## 2022-04-28 DIAGNOSIS — J44.9 COPD WITHOUT EXACERBATION (HCC): ICD-10-CM

## 2022-04-28 DIAGNOSIS — Z01.89 ENCOUNTER FOR GERIATRIC ASSESSMENT: ICD-10-CM

## 2022-04-28 DIAGNOSIS — E78.49 OTHER HYPERLIPIDEMIA: ICD-10-CM

## 2022-04-28 DIAGNOSIS — I10 PRIMARY HYPERTENSION: ICD-10-CM

## 2022-04-28 DIAGNOSIS — J43.8 OTHER EMPHYSEMA (HCC): ICD-10-CM

## 2022-04-28 LAB
ALBUMIN SERPL BCP-MCNC: 3.8 G/DL (ref 3.5–5)
ALP SERPL-CCNC: 125 U/L (ref 46–116)
ALT SERPL W P-5'-P-CCNC: 24 U/L (ref 12–78)
ANION GAP SERPL CALCULATED.3IONS-SCNC: 5 MMOL/L (ref 4–13)
AST SERPL W P-5'-P-CCNC: 19 U/L (ref 5–45)
BASOPHILS # BLD AUTO: 0.05 THOUSANDS/ΜL (ref 0–0.1)
BASOPHILS NFR BLD AUTO: 1 % (ref 0–1)
BILIRUB SERPL-MCNC: 0.65 MG/DL (ref 0.2–1)
BUN SERPL-MCNC: 16 MG/DL (ref 5–25)
CALCIUM SERPL-MCNC: 11.9 MG/DL (ref 8.3–10.1)
CHLORIDE SERPL-SCNC: 103 MMOL/L (ref 100–108)
CO2 SERPL-SCNC: 30 MMOL/L (ref 21–32)
CREAT SERPL-MCNC: 0.8 MG/DL (ref 0.6–1.3)
EOSINOPHIL # BLD AUTO: 0.16 THOUSAND/ΜL (ref 0–0.61)
EOSINOPHIL NFR BLD AUTO: 3 % (ref 0–6)
ERYTHROCYTE [DISTWIDTH] IN BLOOD BY AUTOMATED COUNT: 12.8 % (ref 11.6–15.1)
GFR SERPL CREATININE-BSD FRML MDRD: 68 ML/MIN/1.73SQ M
GLUCOSE SERPL-MCNC: 91 MG/DL (ref 65–140)
HCT VFR BLD AUTO: 40.3 % (ref 34.8–46.1)
HGB BLD-MCNC: 12.3 G/DL (ref 11.5–15.4)
IMM GRANULOCYTES # BLD AUTO: 0.02 THOUSAND/UL (ref 0–0.2)
IMM GRANULOCYTES NFR BLD AUTO: 0 % (ref 0–2)
LYMPHOCYTES # BLD AUTO: 1.33 THOUSANDS/ΜL (ref 0.6–4.47)
LYMPHOCYTES NFR BLD AUTO: 25 % (ref 14–44)
MCH RBC QN AUTO: 27.2 PG (ref 26.8–34.3)
MCHC RBC AUTO-ENTMCNC: 30.5 G/DL (ref 31.4–37.4)
MCV RBC AUTO: 89 FL (ref 82–98)
MONOCYTES # BLD AUTO: 0.56 THOUSAND/ΜL (ref 0.17–1.22)
MONOCYTES NFR BLD AUTO: 11 % (ref 4–12)
NEUTROPHILS # BLD AUTO: 3.13 THOUSANDS/ΜL (ref 1.85–7.62)
NEUTS SEG NFR BLD AUTO: 60 % (ref 43–75)
NRBC BLD AUTO-RTO: 0 /100 WBCS
PLATELET # BLD AUTO: 250 THOUSANDS/UL (ref 149–390)
PMV BLD AUTO: 11.6 FL (ref 8.9–12.7)
POTASSIUM SERPL-SCNC: 4.1 MMOL/L (ref 3.5–5.3)
PREALB SERPL-MCNC: 35.8 MG/DL (ref 18–40)
PROT SERPL-MCNC: 7.3 G/DL (ref 6.4–8.2)
RBC # BLD AUTO: 4.53 MILLION/UL (ref 3.81–5.12)
SODIUM SERPL-SCNC: 138 MMOL/L (ref 136–145)
WBC # BLD AUTO: 5.25 THOUSAND/UL (ref 4.31–10.16)

## 2022-04-28 PROCEDURE — 71046 X-RAY EXAM CHEST 2 VIEWS: CPT

## 2022-04-28 PROCEDURE — 80053 COMPREHEN METABOLIC PANEL: CPT

## 2022-04-28 PROCEDURE — 36415 COLL VENOUS BLD VENIPUNCTURE: CPT

## 2022-04-28 PROCEDURE — 84134 ASSAY OF PREALBUMIN: CPT

## 2022-04-28 PROCEDURE — 85025 COMPLETE CBC W/AUTO DIFF WBC: CPT

## 2022-04-28 PROCEDURE — 99215 OFFICE O/P EST HI 40 MIN: CPT | Performed by: NURSE PRACTITIONER

## 2022-04-28 NOTE — PRE-PROCEDURE INSTRUCTIONS
Pre-Surgery Instructions:   Medication Instructions    albuterol (PROVENTIL HFA,VENTOLIN HFA) 90 mcg/act inhaler Continue to take these inhaler medications on your normal schedule up to and including the day of surgery   atorvastatin (LIPITOR) 40 mg tablet Take night before surgery    b complex vitamins capsule Stop taking 7 days prior to surgery   Breo Ellipta 200-25 MCG/INH inhaler Continue to take these inhaler medications on your normal schedule up to and including the day of surgery   esomeprazole (NexIUM) 40 MG capsule Take day of surgery   famotidine (PEPCID) 40 MG tablet Take night before surgery    losartan-hydrochlorothiazide (HYZAAR) 100-12 5 MG per tablet Hold day of surgery  Have you had / have a sore throat? No  Have you had / have a cough less than 1 week? No  Have you had / have a fever greater than 100 0 - 100  4? No  Are you experiencing any shortness of breath? No  Fully covid vaccinated    Review with patient and her grandson  in person [facility] clinic medications and showering instructions  Instructed to avoid all ASA and OTC Vit/Supp 1 week prior to surgery and to avoid NSAIDs 3 days prior to surgery per anesthesia instructions  Tylenol ok to take prn  [facility] call with surgery schedule time, nothing eat or drink after midnight  Verbalized understanding  See Geriatric Assessment below        Cognitive Assessment: 3   CAM: N/A   TUG <15 sec:No   Falls (last 6 months): No   Hand  score:17  -Attempt 1:16  -Attempt 2:18  -Attempt 3:17   Geovany Total Score: 18   PHQ- 9 Depression Scale:3   Nutrition Assessment Score:14   METS:3 97   Health goals:  -What are your overall health goals? (quit smoking, wt  loss, rest, decrease stress)   Wt Loss  -What brings you strength? (family, friends, Denominational, health)   Friends  -What activities are important to you? (exercise, reading, travel, work)   Gardening

## 2022-05-02 ENCOUNTER — ANESTHESIA EVENT (OUTPATIENT)
Dept: PERIOP | Facility: HOSPITAL | Age: 82
End: 2022-05-02
Payer: COMMERCIAL

## 2022-05-02 ENCOUNTER — TELEPHONE (OUTPATIENT)
Dept: HEMATOLOGY ONCOLOGY | Facility: CLINIC | Age: 82
End: 2022-05-02

## 2022-05-02 NOTE — TELEPHONE ENCOUNTER
Patient states she has to run out for a doctor appt  Patient would just like to know when to stop her medication for upcoming surgery    Please call [phone]

## 2022-05-02 NOTE — TELEPHONE ENCOUNTER
Spoke to patient and answered all questions concerning her upcoming surgery  Patient verbalized understanding and thanks

## 2022-05-02 NOTE — TELEPHONE ENCOUNTER
CALL RETURN FORM   Reason for patient call? Patient is calling in requesting a call back  regarding surgery questions that she has   Patient's primary oncologist?  [address]   Name of person the patient was calling for? Josey   Any additional information to add, if applicable? n/a   Informed patient that the message will be forwarded to the team and someone will get back to them as soon as possible    Did you relay this information to the patient?  Yes, patient can be reached back at [phone]

## 2022-05-13 ENCOUNTER — TELEPHONE (OUTPATIENT)
Dept: SURGICAL ONCOLOGY | Facility: CLINIC | Age: 82
End: 2022-05-13

## 2022-05-23 NOTE — ANESTHESIA PREPROCEDURE EVALUATION
Procedure:  MINIMALLY INVASIVE LEFT PARATHYROIDECTOMY, POSSIBLE 4 GLAND EXPLORATION, INTRAOPERATIVE PTH MONITORING (Left Neck)    Relevant Problems   ANESTHESIA (within normal limits)      CARDIO   (+) Other hyperlipidemia   (+) Primary hypertension      ENDO   (+) Hyperparathyroidism (HCC)      /RENAL   (+) CKD (chronic kidney disease)      GYN   (+) Breast cancer (HCC)      HEMATOLOGY (within normal limits)      NEURO/PSYCH (within normal limits)      PULMONARY   (+) COPD without exacerbation (HCC)      EKG 4/28/2022:  Normal sinus rhythm  Left axis deviation  Left ventricular hypertrophy with repolarization abnormality  Abnormal ECG  No previous ECGs available    Lab Results   Component Value Date    WBC 5 25 04/28/2022    HGB 12 3 04/28/2022    HCT 40 3 04/28/2022    MCV 89 04/28/2022     04/28/2022     Lab Results   Component Value Date    SODIUM 138 04/28/2022    K 4 1 04/28/2022     04/28/2022    CO2 30 04/28/2022    BUN 16 04/28/2022    CREATININE 0 80 04/28/2022    GLUC 91 04/28/2022    CALCIUM 11 9 (H) 04/28/2022     No results found for: INR, PROTIME  Lab Results   Component Value Date    HGBA1C 6 3 03/01/2021          Physical Exam    Airway    Mallampati score: II  TM Distance: >3 FB  Neck ROM: full     Dental   upper dentures and lower dentures,     Cardiovascular  Cardiovascular exam normal    Pulmonary  Pulmonary exam normal     Other Findings        Anesthesia Plan  ASA Score- 3     Anesthesia Type- general with ASA Monitors  Additional Monitors: arterial line  Airway Plan: ETT  Plan Factors-Exercise tolerance (METS): <4 METS  Chart reviewed  EKG reviewed  Existing labs reviewed  Patient summary reviewed  Induction- intravenous  Postoperative Plan-     Informed Consent- Anesthetic plan and risks discussed with patient  I personally reviewed this patient with the CRNA  Discussed and agreed on the Anesthesia Plan with the CRNA  Yazmin Barron

## 2022-05-24 ENCOUNTER — ANESTHESIA (OUTPATIENT)
Dept: PERIOP | Facility: HOSPITAL | Age: 82
End: 2022-05-24
Payer: COMMERCIAL

## 2022-05-24 ENCOUNTER — HOSPITAL ENCOUNTER (OUTPATIENT)
Facility: HOSPITAL | Age: 82
Setting detail: OUTPATIENT SURGERY
Discharge: HOME | End: 2022-05-24
Attending: SURGERY | Admitting: SURGERY
Payer: COMMERCIAL

## 2022-05-24 VITALS
WEIGHT: 155 LBS | TEMPERATURE: 98 F | DIASTOLIC BLOOD PRESSURE: 67 MMHG | BODY MASS INDEX: 28.52 KG/M2 | HEART RATE: 72 BPM | RESPIRATION RATE: 16 BRPM | SYSTOLIC BLOOD PRESSURE: 150 MMHG | HEIGHT: 62 IN | OXYGEN SATURATION: 98 %

## 2022-05-24 DIAGNOSIS — E21.3 HYPERPARATHYROIDISM (HCC): ICD-10-CM

## 2022-05-24 DIAGNOSIS — E83.52 HYPERCALCEMIA: ICD-10-CM

## 2022-05-24 LAB
CALCIUM SERPL-MCNC: 10.3 MG/DL (ref 8.3–10.1)
CALCIUM SERPL-MCNC: 10.3 MG/DL (ref 8.3–10.1)
CALCIUM SERPL-MCNC: 9.6 MG/DL (ref 8.3–10.1)
PTH-INTACT SERPL-MCNC: 149.2 PG/ML (ref 18.4–80.1)
PTH-INTACT SERPL-MCNC: 15.6 PG/ML (ref 18.4–80.1)
PTH-INTACT SERPL-MCNC: 18.5 PG/ML (ref 18.4–80.1)
PTH-INTACT SERPL-MCNC: 26.2 PG/ML (ref 18.4–80.1)

## 2022-05-24 PROCEDURE — 88305 TISSUE EXAM BY PATHOLOGIST: CPT | Performed by: PATHOLOGY

## 2022-05-24 PROCEDURE — 88331 PATH CONSLTJ SURG 1 BLK 1SPC: CPT | Performed by: PATHOLOGY

## 2022-05-24 PROCEDURE — 83970 ASSAY OF PARATHORMONE: CPT | Performed by: SURGERY

## 2022-05-24 PROCEDURE — 82310 ASSAY OF CALCIUM: CPT | Performed by: SURGERY

## 2022-05-24 PROCEDURE — 60500 EXPLORE PARATHYROID GLANDS: CPT | Performed by: SURGERY

## 2022-05-24 PROCEDURE — NC001 PR NO CHARGE: Performed by: SURGERY

## 2022-05-24 RX ORDER — GLYCOPYRROLATE 0.2 MG/ML
INJECTION INTRAMUSCULAR; INTRAVENOUS AS NEEDED
Status: DISCONTINUED | OUTPATIENT
Start: 2022-05-24 | End: 2022-05-24

## 2022-05-24 RX ORDER — NEOSTIGMINE METHYLSULFATE 1 MG/ML
INJECTION INTRAVENOUS AS NEEDED
Status: DISCONTINUED | OUTPATIENT
Start: 2022-05-24 | End: 2022-05-24

## 2022-05-24 RX ORDER — PROPOFOL 10 MG/ML
INJECTION, EMULSION INTRAVENOUS AS NEEDED
Status: DISCONTINUED | OUTPATIENT
Start: 2022-05-24 | End: 2022-05-24

## 2022-05-24 RX ORDER — SODIUM CHLORIDE, SODIUM LACTATE, POTASSIUM CHLORIDE, CALCIUM CHLORIDE 600; 310; 30; 20 MG/100ML; MG/100ML; MG/100ML; MG/100ML
100 INJECTION, SOLUTION INTRAVENOUS CONTINUOUS
Status: DISCONTINUED | OUTPATIENT
Start: 2022-05-24 | End: 2022-05-24 | Stop reason: HOSPADM

## 2022-05-24 RX ORDER — LABETALOL HYDROCHLORIDE 5 MG/ML
INJECTION, SOLUTION INTRAVENOUS AS NEEDED
Status: DISCONTINUED | OUTPATIENT
Start: 2022-05-24 | End: 2022-05-24

## 2022-05-24 RX ORDER — ACETAMINOPHEN 325 MG/1
650 TABLET ORAL EVERY 6 HOURS PRN
Status: DISCONTINUED | OUTPATIENT
Start: 2022-05-24 | End: 2022-05-24 | Stop reason: HOSPADM

## 2022-05-24 RX ORDER — OXYCODONE HYDROCHLORIDE 5 MG/1
5 TABLET ORAL EVERY 4 HOURS PRN
Status: DISCONTINUED | OUTPATIENT
Start: 2022-05-24 | End: 2022-05-24 | Stop reason: HOSPADM

## 2022-05-24 RX ORDER — ROCURONIUM BROMIDE 10 MG/ML
INJECTION, SOLUTION INTRAVENOUS AS NEEDED
Status: DISCONTINUED | OUTPATIENT
Start: 2022-05-24 | End: 2022-05-24

## 2022-05-24 RX ORDER — FENTANYL CITRATE/PF 50 MCG/ML
25 SYRINGE (ML) INJECTION
Status: DISCONTINUED | OUTPATIENT
Start: 2022-05-24 | End: 2022-05-24 | Stop reason: HOSPADM

## 2022-05-24 RX ORDER — BUPIVACAINE HYDROCHLORIDE 5 MG/ML
INJECTION, SOLUTION PERINEURAL AS NEEDED
Status: DISCONTINUED | OUTPATIENT
Start: 2022-05-24 | End: 2022-05-24 | Stop reason: HOSPADM

## 2022-05-24 RX ORDER — SODIUM CHLORIDE, SODIUM LACTATE, POTASSIUM CHLORIDE, CALCIUM CHLORIDE 600; 310; 30; 20 MG/100ML; MG/100ML; MG/100ML; MG/100ML
50 INJECTION, SOLUTION INTRAVENOUS CONTINUOUS
Status: DISCONTINUED | OUTPATIENT
Start: 2022-05-24 | End: 2022-05-24 | Stop reason: HOSPADM

## 2022-05-24 RX ORDER — LIDOCAINE HYDROCHLORIDE 10 MG/ML
INJECTION, SOLUTION EPIDURAL; INFILTRATION; INTRACAUDAL; PERINEURAL AS NEEDED
Status: DISCONTINUED | OUTPATIENT
Start: 2022-05-24 | End: 2022-05-24

## 2022-05-24 RX ORDER — ONDANSETRON 2 MG/ML
4 INJECTION INTRAMUSCULAR; INTRAVENOUS ONCE AS NEEDED
Status: DISCONTINUED | OUTPATIENT
Start: 2022-05-24 | End: 2022-05-24 | Stop reason: HOSPADM

## 2022-05-24 RX ORDER — HYDRALAZINE HYDROCHLORIDE 20 MG/ML
5 INJECTION INTRAMUSCULAR; INTRAVENOUS ONCE
Status: COMPLETED | OUTPATIENT
Start: 2022-05-24 | End: 2022-05-24

## 2022-05-24 RX ORDER — FENTANYL CITRATE 50 UG/ML
INJECTION, SOLUTION INTRAMUSCULAR; INTRAVENOUS AS NEEDED
Status: DISCONTINUED | OUTPATIENT
Start: 2022-05-24 | End: 2022-05-24

## 2022-05-24 RX ORDER — HYDROMORPHONE HCL IN WATER/PF 6 MG/30 ML
0.2 PATIENT CONTROLLED ANALGESIA SYRINGE INTRAVENOUS
Status: DISCONTINUED | OUTPATIENT
Start: 2022-05-24 | End: 2022-05-24 | Stop reason: HOSPADM

## 2022-05-24 RX ORDER — ONDANSETRON 2 MG/ML
INJECTION INTRAMUSCULAR; INTRAVENOUS AS NEEDED
Status: DISCONTINUED | OUTPATIENT
Start: 2022-05-24 | End: 2022-05-24

## 2022-05-24 RX ORDER — DEXAMETHASONE SODIUM PHOSPHATE 10 MG/ML
INJECTION, SOLUTION INTRAMUSCULAR; INTRAVENOUS AS NEEDED
Status: DISCONTINUED | OUTPATIENT
Start: 2022-05-24 | End: 2022-05-24

## 2022-05-24 RX ADMIN — LIDOCAINE HYDROCHLORIDE 50 MG: 10 INJECTION, SOLUTION EPIDURAL; INFILTRATION; INTRACAUDAL at 09:21

## 2022-05-24 RX ADMIN — DEXAMETHASONE SODIUM PHOSPHATE 10 MG: 10 INJECTION INTRAMUSCULAR; INTRAVENOUS at 09:35

## 2022-05-24 RX ADMIN — SODIUM CHLORIDE, SODIUM LACTATE, POTASSIUM CHLORIDE, AND CALCIUM CHLORIDE 50 ML/HR: .6; .31; .03; .02 INJECTION, SOLUTION INTRAVENOUS at 08:22

## 2022-05-24 RX ADMIN — PROPOFOL 140 MG: 10 INJECTION, EMULSION INTRAVENOUS at 09:21

## 2022-05-24 RX ADMIN — Medication 5 MG: at 11:21

## 2022-05-24 RX ADMIN — NEOSTIGMINE METHYLSULFATE 2 MG: 1 INJECTION INTRAVENOUS at 11:10

## 2022-05-24 RX ADMIN — HYDRALAZINE HYDROCHLORIDE 5 MG: 20 INJECTION INTRAMUSCULAR; INTRAVENOUS at 11:59

## 2022-05-24 RX ADMIN — Medication 5 MG: at 11:24

## 2022-05-24 RX ADMIN — FENTANYL CITRATE 50 MCG: 50 INJECTION INTRAMUSCULAR; INTRAVENOUS at 09:21

## 2022-05-24 RX ADMIN — PHENYLEPHRINE HYDROCHLORIDE 10 MCG/MIN: 10 INJECTION INTRAVENOUS at 09:35

## 2022-05-24 RX ADMIN — GLYCOPYRROLATE 0.2 MG: 0.2 INJECTION, SOLUTION INTRAMUSCULAR; INTRAVENOUS at 11:10

## 2022-05-24 RX ADMIN — ROCURONIUM BROMIDE 50 MG: 50 INJECTION INTRAVENOUS at 09:21

## 2022-05-24 RX ADMIN — FENTANYL CITRATE 50 MCG: 50 INJECTION INTRAMUSCULAR; INTRAVENOUS at 10:04

## 2022-05-24 RX ADMIN — ONDANSETRON 4 MG: 2 INJECTION INTRAMUSCULAR; INTRAVENOUS at 10:35

## 2022-05-24 NOTE — OP NOTE
OPERATIVE REPORT  PATIENT NAME: Marianne Villalobos    :  1940  MRN: [id number]  Pt Location: [facility] OR ROOM 05    SURGERY DATE: 2022    Surgeon(s) and Role:     * Serenity Chowdary MD - Primary     * Juju Ross MD - Assisting    Preop Diagnosis:  Hypercalcemia [E83 52]  Hyperparathyroidism ([address] ) [E21 3]    Post-Op Diagnosis Codes:     * Hypercalcemia [E83 52]     * Hyperparathyroidism ([address] ) [E21 3]    Procedure(s) (LRB):  MINIMALLY INVASIVE LEFT PARATHYROIDECTOMY, POSSIBLE 4 GLAND EXPLORATION, INTRAOPERATIVE PTH MONITORING (Left)    Specimen(s):  ID Type Source Tests Collected by Time Destination   1 : r/o left inferior Tissue Parathyroid TISSUE EXAM Serenity Chowdary MD 2022 1033        Estimated Blood Loss:   Minimal    Drains:  * No LDAs found *    Anesthesia Type:   General    Operative Indications:  Hypercalcemia [E83 52]  Hyperparathyroidism ([address] ) [E21 3]      Operative Findings:  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  Component      Latest Ref Rng & Units 2022           7:31 AM 10:32 AM 10:37 AM 10:43 AM   PARATHYROID HORMONE      18 4 - 80 1 pg/mL 149 2 (H) 26 2 18 5 68 5 (L)     Complications:   None    Procedure and Technique:  The patient was brought to the OR and identified by proper time-out  Following this she was intubated by the anesthesia team, then was prepped and draped with the neck exposed in the extended position  Local anesthesia was given, then a 3 cm incision was made sharply 2 finger breaths above the sternal notch  Cautery was used to dissect through the dermis subcutaneous tissue  Wheatlanner retractor was placed  The platysma was then transected with cautery  Strap muscles were then divided the midline  This exposed the surface of the thyroid  We dissected the strap muscles off the anterolateral aspect of the left thyroid lobe  This enabled us to dissect between the thyroid and the strap muscles   The thyroid gland was then retracted medially and anteriorly which exposed what appeared to be a large parathyroid gland along the inferior pole of the thyroid gland, adherent to the thyroid lobe  This was dissected out from surrounding tissue in hemostatic fashion with cautery  Vascular pedicle was visualized  The pedicle was clipped  PTH levels were drawn at the start of the case, 0 min, 5 min, and 10 min after the case  Levels decreased appropriately to normal range upon 10 min post resection of the gland  We then irrigated the field and closed using 3-0 Vicryl to close the strap muscles the midline followed by 3-0 Vicryl to close the platysma, followed by 4-0 Vicryl close the dermis, followed by 5-0 Monocryl to close skin in subcuticular fashion  Benzoin and Steri-Strips were then applied in the usual fashion  The patient was awakened transferred to the recovery room in stable condition      I was present for the entire procedure    Patient Disposition:  PACU       SIGNATURE: Cindy Ly MD  DATE: May 24, 2022  TIME: 10:58 AM

## 2022-05-24 NOTE — H&P
Surgical Oncology Follow Up                                        [facility]  CANCER CARE [facility] SURGICAL ONCOLOGY [facility] [address] [address]     Marie Arevalo  1940  [id number]  [address]  CANCER CARE [facility] SURGICAL ONCOLOGY [place]  [address] [address]         Chief Complaint   Patient presents with    Follow-up         Assessment/Plan:     No problem-specific Assessment & Plan notes found for this encounter          Diagnoses and all orders for this visit:     Hypercalcemia     Hyperparathyroidism ([facility] )         Advance Care Planning/Advance Directives:  Discussed disease status, cancer treatment plans and/or cancer treatment goals with the patient           Oncology History     No history exists          History of Present Illness:  Patient is an 23-year-old woman here for follow-up status post sestamibi scan look for potential parathyroid adenoma   -Interval History:  No new complaints since her last visit      Review of Systems:  Review of Systems   Constitutional: Positive for fatigue  HENT: Negative  Eyes: Negative  Respiratory: Negative  Cardiovascular: Negative  Gastrointestinal: Negative  Endocrine: Negative  Genitourinary: Negative  Musculoskeletal: Positive for arthralgias and myalgias  Skin: Negative  Allergic/Immunologic: Negative  Neurological: Negative  Hematological: Negative      Psychiatric/Behavioral: Negative               Patient Active Problem List   Diagnosis    Hypercalcemia    Hyperparathyroidism [facility])      Medical History        Past Medical History:   Diagnosis Date    Breast cancer [address] )       right, did XRT and surgery and took 5 years of estrogen blocker    CKD (chronic kidney disease) stage 3, GFR 30-59 ml/min (HCC)      Colon polyps      COPD (chronic obstructive pulmonary disease) (HCC)      Fatty liver      GERD (gastroesophageal reflux disease)      Hyperlipidemia      Hypertension      Lymphedema of arm       right    Osteoporosis       was on prolia and now off it per Dr Willem Leung          Surgical History         Past Surgical History:   Procedure Laterality Date    BREAST LUMPECTOMY Right       with AND    PARTIAL HYSTERECTOMY                   Family History   Problem Relation Age of Onset    Heart disease Mother      Stroke Mother      Hypertension Mother      Colon cancer Father      Diabetes type II Sister      Hypertension Sister      Hyperlipidemia Sister      Heart disease Brother      Heart disease Daughter      No Known Problems Son      No Known Problems Son      No Known Problems Son      No Known Problems Son      No Known Problems Brother      Mental retardation Brother        Social History               Socioeconomic History    Marital status:         Spouse name: Not on file    Number of children: Not on file    Years of education: Not on file    Highest education level: Not on file   Occupational History    Not on file   Tobacco Use    Smoking status: Former Smoker       Types: Cigarettes       Quit date:        Years since quittin 3    Smokeless tobacco: Never Used   Vaping Use    Vaping Use: Never used   Substance and Sexual Activity    Alcohol use: Never    Drug use: Never    Sexual activity: Not on file   Other Topics Concern    Not on file   Social History Narrative    Not on file      Social Determinants of Health      Financial Resource Strain: Not on file   Food Insecurity: Not on file   Transportation Needs: Not on file   Physical Activity: Not on file   Stress: Not on file   Social Connections: Not on file   Intimate Partner Violence: Not on file   Housing Stability: Not on file            Current Outpatient Medications:     albuterol (PROVENTIL HFA,VENTOLIN HFA) 90 mcg/act inhaler, Inhale 2 puffs every 6 (six) hours as needed for wheezing, Disp: , Rfl:     atorvastatin (LIPITOR) 40 mg tablet, Take 40 mg by mouth daily , Disp: , Rfl:     b complex vitamins capsule, Take 1 capsule by mouth daily, Disp: , Rfl:     Breo Ellipta 200-25 MCG/INH inhaler, Inhale 1 puff daily , Disp: , Rfl:     cinacalcet (SENSIPAR) 30 mg tablet, Take 1 tablet (30 mg total) by mouth daily, Disp: 30 tablet, Rfl: 6    esomeprazole (NexIUM) 40 MG capsule, Take 40 mg by mouth daily , Disp: , Rfl:     famotidine (PEPCID) 40 MG tablet, Take 40 mg by mouth daily at bedtime , Disp: , Rfl:     losartan-hydrochlorothiazide (HYZAAR) 100-12 5 MG per tablet, Take 1 tablet by mouth daily , Disp: , Rfl:     traMADol (Ultram) 50 mg tablet, Take 1 tablet (50 mg total) by mouth every 6 (six) hours as needed for moderate pain, Disp: 10 tablet, Rfl: 0       Allergies   Allergen Reactions    Pollen Extract Other (See Comments)          Vitals:   BP (!) 178/67   Pulse 58   Temp 97 5 °F (36 4 °C) (Temporal)   Resp 16   SpO2 98%     Physical Exam  Vitals reviewed  HENT:      Head: Normocephalic and atraumatic  Right Ear: External ear normal       Left Ear: External ear normal       Nose: Nose normal       Mouth/Throat:      Mouth: Mucous membranes are moist    Eyes:      Extraocular Movements: Extraocular movements intact  Pupils: Pupils are equal, round, and reactive to light  Cardiovascular:      Rate and Rhythm: Normal rate and regular rhythm  Heart sounds: Normal heart sounds  Pulmonary:      Effort: Pulmonary effort is normal       Breath sounds: Normal breath sounds  Abdominal:      General: Abdomen is flat  Palpations: Abdomen is soft  Musculoskeletal:         General: Normal range of motion  Cervical back: Normal range of motion and neck supple  No rigidity or tenderness  Right lower leg: No edema  Left lower leg: No edema  Lymphadenopathy:      Cervical: No cervical adenopathy  Skin:     General: Skin is warm and dry  Neurological:      General: No focal deficit present        Mental Status: She is oriented to person, place, and time  Psychiatric:         Mood and Affect: Mood normal          Behavior: Behavior normal          Thought Content: Thought content normal          Judgment: Judgment normal                Results:  Labs:  No results found for: PTH, CALCIUM, CAION, PHOS        Imaging  NM parathyroid scan w spect     Result Date: 4/5/2022  Narrative: PARATHYROID SCAN INDICATION:  E21 3: Hyperparathyroidism, unspecified COMPARISON:  None  TECHNIQUE:   Following the intravenous administration of 26 6 mCi Tc-99m Cardiolite, anterior and bilateral anterior oblique projection images of the neck and mediastinum were obtained at approximately 10 minutes post injection followed at 2 hours post injection by static anterior and bilateral oblique projections as well as SPECT images in coronal, sagittal and axial projections  FINDINGS: Immediate imaging demonstrates slightly asymmetric nodular tracer activity in the region of the left upper to mid thyroid lobe  Tracer distribution is otherwise physiologic  Delayed imaging demonstrates near complete washout of tracer activity from the thyroid gland with subtle asymmetric persistent tracer activity about the left upper to mid thyroid lobe which is difficult to definitively anatomically localize on SPECT imaging  While I favor findings to be related to incomplete washout of tracer activity from the thyroid gland, parathyroid adenoma versus thyroid adenoma/carcinoma is within the differential diagnosis  Consider correlation with ultrasound as clinically  indicated       Impression: No focal tracer activity characteristic of parathyroid adenoma  However, note is made of mild asymmetric persistent focus of tracer activity about the left upper to mid thyroid lobe of uncertain clinical significance, see discussion above for differential diagnosis which includes subtle parathyroid adenoma  Consider  correlation with ultrasound as clinically indicated  Workstation performed: [id number]      I reviewed the above laboratory and imaging data      Discussion/Summary:  78-year-old woman, primary hyperparathyroidism, suspected left-sided target based on sestamibi scan  She is amenable to and a candidate for minimally invasive parathyroidectomy, possible 4 gland exploration, with intraoperative PTH monitoring  Rationale for this along with risks and benefits of surgery including infection, bleeding, need for possible additional surgery, discussed with her

## 2022-05-24 NOTE — ANESTHESIA POSTPROCEDURE EVALUATION
Post-Op Assessment Note    CV Status:  Stable    Pain management: adequate     Mental Status:  Alert and awake   Hydration Status:  Euvolemic   PONV Controlled:  Controlled   Airway Patency:  Patent      Post Op Vitals Reviewed: Yes      Staff: CRNA   Comments: VS as noted report RN        No complications documented      BP (!) 196/78 (05/24/22 1132)    Temp (!) 97 °F (36 1 °C) (05/24/22 1132)    Pulse 58 (05/24/22 1132)   Resp 12 (05/24/22 1132)    SpO2   91 N/C  3l

## 2022-05-24 NOTE — DISCHARGE INSTRUCTIONS
Please follow up with Dr Conrad Christian  There is a small band aid strip over your incision  Beginning tomorrow, you can shower with gentle soap and water as usual and the band aid strip will fall off over time  Do not soak your incision (eg in a bath) for 10 days  Use over the counter tylenol and advil as needed for pain - these can be utilized at the same time  For example, if needed, I recommend 1G tylenol @ 0900, 600 mg advil @ 1200, 1G tylenol @ 1500, 600 mg advil @ 1800, etc  If you have pain that exceeds this, take prescribed medication as needed  Expect tenderness after surgery  If you have any significant fever (>101), spreading redness, drainage of pus-like fluid--please call office If you have any other issues or questions--please call office

## 2022-05-24 NOTE — ANESTHESIA PROCEDURE NOTES
Arterial Line Insertion    Date/Time: 5/24/2022 9:30 AM  Performed by: Randi Abel CRNA  Authorized by: Leonor Lange MD   Consent: Verbal consent obtained  Written consent obtained  Risks and benefits: risks, benefits and alternatives were discussed  Consent given by: patient  Patient understanding: patient states understanding of the procedure being performed  Patient consent: the patient's understanding of the procedure matches consent given  Procedure consent: procedure consent matches procedure scheduled  Relevant documents: relevant documents present and verified  Test results: test results available and properly labeled  Site marked: the operative site was not marked  Radiology Images: No Radiology Images displayed or report reviewed  Required items: required blood products, implants, devices, and special equipment available  Patient identity confirmed: arm band  Time out: Immediately prior to procedure a "time out" was called to verify the correct patient, procedure, equipment, support staff and site/side marked as required  Preparation: Patient was prepped and draped in the usual sterile fashion    Indications: multiple ABGs  Orientation:  Left  Location: radial artery  Procedure Details:  Barrera's test normal: no  Needle gauge: 20  Number of attempts: 2    Post-procedure:  Post-procedure: dressing applied  Waveform: good waveform  Post-procedure CNS: normal

## 2022-06-02 ENCOUNTER — TELEPHONE (OUTPATIENT)
Dept: SURGICAL ONCOLOGY | Facility: CLINIC | Age: 82
End: 2022-06-02

## 2022-06-02 ENCOUNTER — TELEPHONE (OUTPATIENT)
Dept: HEMATOLOGY ONCOLOGY | Facility: CLINIC | Age: 82
End: 2022-06-02

## 2022-06-02 NOTE — TELEPHONE ENCOUNTER
Appointment Cancellation Or Reschedule     Person calling in Patient    Provider Dr Rosenda Tsai   Office Visit Date and Time  6/10/22 1:00 pm   Office Visit Location [facility]   Did patient want to reschedule their office appointment? If so, when was it scheduled to? Yes  6/10/22 9:00 am   Is this patient calling to reschedule an infusion appointment? no   When is their next infusion appointment? n/a   Is this patient a Chemo patient? no   Reason for Cancellation or Reschedule Patient returning call to move Appt up to a morning appointment  If the patient is a treatment patient, please route this to the office nurse  If the patient is not on treatment, please route to the office MA

## 2022-06-07 PROBLEM — Z90.89 STATUS POST PARATHYROIDECTOMY: Status: ACTIVE | Noted: 2022-06-07

## 2022-06-07 PROBLEM — Z98.890 STATUS POST PARATHYROIDECTOMY: Status: ACTIVE | Noted: 2022-06-07

## 2022-06-07 PROBLEM — E89.2 STATUS POST PARATHYROIDECTOMY (HCC): Status: ACTIVE | Noted: 2022-06-07

## 2022-06-10 ENCOUNTER — OFFICE VISIT (OUTPATIENT)
Dept: SURGICAL ONCOLOGY | Facility: CLINIC | Age: 82
End: 2022-06-10

## 2022-06-10 VITALS
TEMPERATURE: 97.7 F | SYSTOLIC BLOOD PRESSURE: 130 MMHG | WEIGHT: 156 LBS | HEIGHT: 62 IN | BODY MASS INDEX: 28.71 KG/M2 | DIASTOLIC BLOOD PRESSURE: 70 MMHG

## 2022-06-10 DIAGNOSIS — E89.2 STATUS POST PARATHYROIDECTOMY (HCC): Primary | ICD-10-CM

## 2022-06-10 PROCEDURE — 99024 POSTOP FOLLOW-UP VISIT: CPT | Performed by: SURGERY

## 2022-06-10 NOTE — LETTER
Alejandra 10, 2022     Derrick Marrero DO  [address]  [address]    Patient: Bunny Abarca   YOB: 1940   Date of Visit: 6/10/2022       Dear Dr Lord Briseno: Thank you for referring Bunny Abarca to me for evaluation  Below are my notes for this consultation  If you have questions, please do not hesitate to call me  I look forward to following your patient along with you  Sincerely,        Alessia Worrell MD        CC: MD Lori Ramos PA-C Armstead Friedlander, MD  6/10/2022  9:13 AM  Sign when Signing Visit     Surgical Oncology Follow Up       [facility]  [address] [address]    Bunny Abarca  [birth year]  [id number]  [facility]  CANCER CARE [facility] SURGICAL ONCOLOGY [place]  [address] [address]    No chief complaint on file  Assessment/Plan:    No problem-specific Assessment & Plan notes found for this encounter  Diagnoses and all orders for this visit:    Status post parathyroidectomy [facility])        Advance Care Planning/Advance Directives:  Discussed disease status, cancer treatment plans and/or cancer treatment goals with the patient  Oncology History    No history exists  History of Present Illness:  Patient is an 78-year-old woman here for postop check status post parathyroidectomy  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   -Interval History:  No issues or complaints since surgery      Review of Systems:  Review of Systems    Patient Active Problem List   Diagnosis    Hypercalcemia    [facility] )    Encounter for geriatric assessment    Primary hypertension    Other hyperlipidemia    CKD (chronic kidney disease)    COPD without exacerbation ([facility] )    Breast cancer ([facility] )    Status post parathyroidectomy ([facility] )     Past Medical History:   Diagnosis Date    Arthritis     Breast [facility] ([facility] )     right, did XRT and surgery and took 5 years of estrogen blocker    [facility]     Chronic kidney disease     CKD (chronic kidney disease) stage 3, GFR 30-59 ml/min (HCC)     Colon polyps     COPD (chronic obstructive pulmonary disease) (HCC)     Fatty liver     GERD (gastroesophageal reflux disease)     Hyperlipidemia     Hypertension     Lymphedema of arm     right    Osteoporosis     was on prolia and now off it per Dr Tesfaye Burns   Pneumonia      Past Surgical History:   Procedure Laterality Date    BREAST LUMPECTOMY Right     with AND    COLONOSCOPY      PARTIAL HYSTERECTOMY      ID EXPLORE PARATHYROID GLANDS Left 2022    Procedure: MINIMALLY INVASIVE LEFT PARATHYROIDECTOMY, POSSIBLE 4 GLAND EXPLORATION, INTRAOPERATIVE PTH MONITORING;  Surgeon: Praveena Phan MD;  Location: [facility];  Service: Surgical Oncology     Family History   Problem Relation Age of Onset    Heart disease Mother     Stroke Mother     Hypertension Mother     Colon cancer Father     Diabetes type II Sister     Hypertension Sister     Hyperlipidemia Sister     Heart disease Brother     Heart disease Daughter     No Known Problems Son     No Known Problems Son     No Known Problems Son     No Known Problems Son     No Known Problems Brother     Mental retardation Brother      Social History     Socioeconomic History    Marital status:       Spouse name: Not on file    Number of children: Not on file    Years of education: Not on file    Highest education level: Not on file   Occupational History    Not on file   Tobacco Use    Smoking status: Former Smoker     Types: Cigarettes     Quit date:      Years since quittin 4    Smokeless tobacco: Never Used   Vaping Use    Vaping Use: Never used   Substance and Sexual Activity    Alcohol use: Never    Drug use: Never    Sexual activity: Not on file   Other Topics Concern    Not on file   Social History Narrative    Not on file     Social Determinants of Health     Financial Resource Strain: Not on file   Food Insecurity: Not on file   Transportation Needs: Not on file   Physical Activity: Not on file   Stress: Not on file   Social Connections: Not on file   Intimate Partner Violence: Not on file   Housing Stability: Not on file       Current Outpatient Medications:     albuterol (PROVENTIL HFA,VENTOLIN HFA) 90 mcg/act inhaler, Inhale 2 puffs every 6 (six) hours as needed for wheezing, Disp: , Rfl:     atorvastatin (LIPITOR) 40 mg tablet, Take 40 mg by mouth daily , Disp: , Rfl:     b complex vitamins capsule, Take 1 capsule by mouth daily, Disp: , Rfl:     Breo Ellipta 200-25 MCG/INH inhaler, Inhale 1 puff daily , Disp: , Rfl:     esomeprazole (NexIUM) 40 MG capsule, Take 40 mg by mouth daily , Disp: , Rfl:     famotidine (PEPCID) 40 MG tablet, Take 40 mg by mouth daily at bedtime , Disp: , Rfl:     losartan-hydrochlorothiazide (HYZAAR) 100-12 5 MG per tablet, Take 1 tablet by mouth daily , Disp: , Rfl:     traMADol (Ultram) 50 mg tablet, Take 1 tablet (50 mg total) by mouth every 6 (six) hours as needed for moderate pain, Disp: 10 tablet, Rfl: 0  Allergies   Allergen Reactions    Pollen Extract Other (See Comments)     Vitals:    06/10/22 0852   BP: 130/70   Temp: 97 7 °F (36 5 °C)       Physical Exam  Vitals reviewed  Neck:      Comments: Incision clean dry intact  Musculoskeletal:      Cervical back: Normal range of motion and neck supple  No rigidity or tenderness  Lymphadenopathy:      Cervical: No cervical adenopathy             Results:  Labs:  Lab Results   Component Value Date    PTH 15 6 (L) 05/24/2022    CALCIUM 9 6 05/24/2022     Surgical Pathology Report                         Case: [id number]                                    Authorizing Provider: Sonu Jaramillo MD        Collected:           05/24/2022 1033               Ordering Location:     [address]      Received:            05/24/2022 [facility] Operating Room                                                       Pathologist:           Jie Martinez MD                                                           Intraop:               Jie Martinez MD                                                           Specimen:    Parathyroid, r/o left inferior                                                             Final Diagnosis   A  Parathyroid, left inferior, parathyroidectomy:  -  Hypercellular parathyroid gland (280 mg), may be compatible with adenoma in the correct clinical setting       Electronically signed by Jie Martinez MD on 5/27/2022 at  8:53 AM   Comments: This is an appended report  These results have been appended to a previously preliminary verified report  Imaging  No results found  I reviewed the above laboratory and imaging data  Discussion/Summary:  55-year-old woman status post left minimally invasive parathyroidectomy  She is doing well  Follow-up in 6 months to assess for durability of operation with repeat blood work at that time  All questions answered

## 2022-06-10 NOTE — PROGRESS NOTES
Surgical Oncology Follow Up       [facility] SURGICAL ONCOLOGY [facility] [address]    Riz[employer]ana Records  [birth year]  [id number]  [address] [facility] SURGICAL ONCOLOGY [place]  [place] [address]    No chief complaint on file  Assessment/Plan:    No problem-specific Assessment & Plan notes found for this encounter  Diagnoses and all orders for this visit:    Status post parathyroidectomy [facility])        Advance Care Planning/Advance Directives:  Discussed disease status, cancer treatment plans and/or cancer treatment goals with the patient  Oncology History    No history exists  History of Present Illness:  Patient is an 49-year-old woman here for postop check status post parathyroidectomy  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   -Interval History:  No issues or complaints since surgery  Review of Systems:  Review of Systems    Patient Active Problem List   Diagnosis    Hypercalcemia    [facility]    Encounter for geriatric assessment    Primary hypertension    Other hyperlipidemia    CKD (chronic kidney disease)    COPD without exacerbation ([facility]    Breast cancer ([facility] )    Status post parathyroidectomy ([facility] )     Past Medical History:   Diagnosis Date    Arthritis     Breast cancer ([address] )     right, did XRT and surgery and took 5 years of estrogen blocker    Cancer ([facility] [facility] )     Chronic kidney disease     CKD (chronic kidney disease) stage 3, GFR 30-59 ml/min (HCC)     Colon polyps     COPD (chronic obstructive pulmonary disease) ([address] )     Fatty liver     GERD (gastroesophageal reflux disease)     Hyperlipidemia     Hypertension     Lymphedema of arm     right    Osteoporosis     was on prolia and now off it per Dr Clau Plunkett      Pneumonia      Past Surgical History:   Procedure Laterality Date    BREAST LUMPECTOMY Right     with AND    COLONOSCOPY      PARTIAL HYSTERECTOMY      NE EXPLORE PARATHYROID GLANDS Left 2022    Procedure: MINIMALLY INVASIVE LEFT PARATHYROIDECTOMY, POSSIBLE 4 GLAND EXPLORATION, INTRAOPERATIVE PTH MONITORING;  Surgeon: Anjali Oakes MD;  [facility];  Service: Surgical Oncology     Family History   Problem Relation Age of Onset    Heart disease Mother     Stroke Mother     Hypertension Mother     Colon cancer Father     Diabetes type II Sister     Hypertension Sister     Hyperlipidemia Sister     Heart disease Brother     Heart disease Daughter     No Known Problems Son     No Known Problems Son     No Known Problems Son     No Known Problems Son     No Known Problems Brother     Mental retardation Brother      Social History     Socioeconomic History    Marital status:       Spouse name: Not on file    Number of children: Not on file    Years of education: Not on file    Highest education level: Not on file   Occupational History    Not on file   Tobacco Use    Smoking status: Former Smoker     Types: Cigarettes     Quit date:      Years since quittin     Smokeless tobacco: Never Used   Vaping Use    Vaping Use: Never used   Substance and Sexual Activity    Alcohol use: Never    Drug use: Never    Sexual activity: Not on file   Other Topics Concern    Not on file   Social History Narrative    Not on file     Social Determinants of Health     Financial Resource Strain: Not on file   Food Insecurity: Not on file   Transportation Needs: Not on file   Physical Activity: Not on file   Stress: Not on file   Social Connections: Not on file   Intimate Partner Violence: Not on file   Housing Stability: Not on file       Current Outpatient Medications:     albuterol (PROVENTIL HFA,VENTOLIN HFA) 90 mcg/act inhaler, Inhale 2 puffs every 6 (six) hours as needed for wheezing, Disp: , Rfl:     atorvastatin (LIPITOR) 40 mg tablet, Take 40 mg by mouth daily , Disp: , Rfl:    b complex vitamins capsule, Take 1 capsule by mouth daily, Disp: , Rfl:     Breo Ellipta 200-25 MCG/INH inhaler, Inhale 1 puff daily , Disp: , Rfl:     esomeprazole (NexIUM) 40 MG capsule, Take 40 mg by mouth daily , Disp: , Rfl:     famotidine (PEPCID) 40 MG tablet, Take 40 mg by mouth daily at bedtime , Disp: , Rfl:     losartan-hydrochlorothiazide (HYZAAR) 100-12 5 MG per tablet, Take 1 tablet by mouth daily , Disp: , Rfl:     traMADol (Ultram) 50 mg tablet, Take 1 tablet (50 mg total) by mouth every 6 (six) hours as needed for moderate pain, Disp: 10 tablet, Rfl: 0  Allergies   Allergen Reactions    Pollen Extract Other (See Comments)     Vitals:    06/10/22 0852   BP: 130/70   Temp: 97 7 °F (36 5 °C)       Physical Exam  Vitals reviewed  Neck:      Comments: Incision clean dry intact  Musculoskeletal:      Cervical back: Normal range of motion and neck supple  No rigidity or tenderness  Lymphadenopathy:      Cervical: No cervical adenopathy  Results:  Labs:  Lab Results   Component Value Date    PTH 15 6 (L) 05/24/2022    CALCIUM 9 6 05/24/2022     Surgical Pathology Report                         Case: [id number]                                    Authorizing Provider: Sonu Jaramillo MD        Collected:           05/24/2022 1033               Ordering Location:     [address]      Received:            05/24/2022 [address] [place] [address] Operating Room                                                       Pathologist:           Michelle Everett MD                                                           Intraop:               Michelle Everett MD                                                           Specimen:    Parathyroid, r/o left inferior                                                             Final Diagnosis   A   Parathyroid, left inferior, parathyroidectomy:  -  Hypercellular parathyroid gland (280 mg), may be compatible with adenoma in the correct clinical setting       Electronically signed by Noemy Sifuentes MD on 5/27/2022 at  8:53 AM   Comments: This is an appended report  These results have been appended to a previously preliminary verified report  Imaging  No results found  I reviewed the above laboratory and imaging data  Discussion/Summary:  10-year-old woman status post left minimally invasive parathyroidectomy  She is doing well  Follow-up in 6 months to assess for durability of operation with repeat blood work at that time  All questions answered

## 2022-06-30 ENCOUNTER — OFFICE VISIT (OUTPATIENT)
Dept: FAMILY MEDICINE CLINIC | Facility: CLINIC | Age: 82
End: 2022-06-30
Payer: COMMERCIAL

## 2022-06-30 VITALS
HEIGHT: 62 IN | TEMPERATURE: 97.2 F | RESPIRATION RATE: 16 BRPM | WEIGHT: 155.6 LBS | OXYGEN SATURATION: 96 % | BODY MASS INDEX: 28.63 KG/M2 | DIASTOLIC BLOOD PRESSURE: 76 MMHG | SYSTOLIC BLOOD PRESSURE: 146 MMHG | HEART RATE: 72 BPM

## 2022-06-30 DIAGNOSIS — E89.2 STATUS POST PARATHYROIDECTOMY (HCC): ICD-10-CM

## 2022-06-30 DIAGNOSIS — N18.9 CHRONIC KIDNEY DISEASE, UNSPECIFIED CKD STAGE: ICD-10-CM

## 2022-06-30 DIAGNOSIS — Z13.6 SCREENING FOR CARDIOVASCULAR CONDITION: ICD-10-CM

## 2022-06-30 DIAGNOSIS — Z13.29 SCREENING FOR THYROID DISORDER: ICD-10-CM

## 2022-06-30 DIAGNOSIS — I10 PRIMARY HYPERTENSION: ICD-10-CM

## 2022-06-30 DIAGNOSIS — Z13.1 SCREENING FOR DIABETES MELLITUS: ICD-10-CM

## 2022-06-30 DIAGNOSIS — J44.9 COPD WITHOUT EXACERBATION (HCC): Primary | ICD-10-CM

## 2022-06-30 PROBLEM — K21.9 GASTROESOPHAGEAL REFLUX DISEASE WITHOUT ESOPHAGITIS: Status: ACTIVE | Noted: 2022-06-30

## 2022-06-30 PROBLEM — J30.89 ENVIRONMENTAL AND SEASONAL ALLERGIES: Status: ACTIVE | Noted: 2022-06-30

## 2022-06-30 PROCEDURE — 3078F DIAST BP <80 MM HG: CPT | Performed by: NURSE PRACTITIONER

## 2022-06-30 PROCEDURE — 3288F FALL RISK ASSESSMENT DOCD: CPT | Performed by: NURSE PRACTITIONER

## 2022-06-30 PROCEDURE — 1160F RVW MEDS BY RX/DR IN RCRD: CPT | Performed by: NURSE PRACTITIONER

## 2022-06-30 PROCEDURE — 1036F TOBACCO NON-USER: CPT | Performed by: NURSE PRACTITIONER

## 2022-06-30 PROCEDURE — 99204 OFFICE O/P NEW MOD 45 MIN: CPT | Performed by: NURSE PRACTITIONER

## 2022-06-30 PROCEDURE — 3077F SYST BP >= 140 MM HG: CPT | Performed by: NURSE PRACTITIONER

## 2022-06-30 PROCEDURE — 3725F SCREEN DEPRESSION PERFORMED: CPT | Performed by: NURSE PRACTITIONER

## 2022-06-30 PROCEDURE — 1101F PT FALLS ASSESS-DOCD LE1/YR: CPT | Performed by: NURSE PRACTITIONER

## 2022-06-30 NOTE — PROGRESS NOTES
Assessment/Plan:    No problem-specific Assessment & Plan notes found for this encounter  Problem List Items Addressed This Visit        Respiratory    COPD without exacerbation ([facility] ) - Primary       Cardiovascular and Mediastinum    Primary hypertension       Genitourinary    CKD (chronic kidney disease)    Relevant Orders    Comprehensive metabolic panel       Other    Status post parathyroidectomy ([facility] [address] )      Other Visit Diagnoses     Screening for cardiovascular condition        Relevant Orders    CBC and differential    Comprehensive metabolic panel    Lipid panel    Screening for thyroid disorder        Relevant Orders    TSH, 3rd generation with Free T4 reflex    Screening for diabetes mellitus        Relevant Orders    UA (URINE) with reflex to Scope            Subjective:      Patient ID: Suman Disla is a 78 y o  female  Patient here today for a check up and to establish care  Patient is doing well  Patient recently had a parathyroidectomy on 9/37/67 without complications  Patient has been following with Oncology for parathyroidectomy  Patient also follows with Endocrine  Patient does not see a Pulmonologist and COPD managed by previous PCP  Patient reports COPD well controlled on Breo Elipta inhaler  Patient had Medicare Annual Wellness exam on 3/1/22  Patient reports she was told she had mild kidney disease in the past and that she had a cyst to one of her kidneys  Patient reports history of HTN and occasionally takes BP readings at home which she reports have been normal  Patient denies fever, chills, URI symptoms, chest pain, sob, ford, or abdominal pain  The following portions of the patient's history were reviewed and updated as appropriate: allergies, current medications, past family history, past medical history, past social history, past surgical history and problem list     Review of Systems   Constitutional: Negative  Negative for chills, fatigue and fever  HENT: Negative  Negative for congestion, ear discharge, ear pain, rhinorrhea, sinus pressure, sinus pain and sore throat  Eyes: Negative  Negative for pain, discharge and visual disturbance  Respiratory: Positive for cough (occasional productive cough at baseline; pt attributes to COPD)  Negative for chest tightness, shortness of breath and wheezing  Cardiovascular: Negative  Negative for chest pain, palpitations and leg swelling  Gastrointestinal: Negative  Negative for abdominal pain, constipation, diarrhea, nausea and vomiting  Endocrine: Negative  Negative for polydipsia and polyuria  Genitourinary: Negative  Negative for difficulty urinating, dysuria and hematuria  Musculoskeletal: Positive for back pain (occasional lower back pain )  Negative for arthralgias, joint swelling and myalgias  Skin: Negative  Negative for rash and wound  Allergic/Immunologic: Negative  Negative for environmental allergies  Neurological: Negative  Negative for dizziness, seizures, syncope, weakness, light-headedness and headaches  Hematological: Negative  Does not bruise/bleed easily  Psychiatric/Behavioral: Negative  Negative for dysphoric mood  The patient is not nervous/anxious  Objective:      /76   Pulse 72   Temp (!) 97 2 °F (36 2 °C) (Tympanic)   Resp 16   Ht 5' 2" (1 575 m)   Wt 70 6 kg (155 lb 9 6 oz)   SpO2 96%   BMI 28 46 kg/m²          Physical Exam  Vitals reviewed  Constitutional:       General: She is not in acute distress  Appearance: Normal appearance  She is not ill-appearing  HENT:      Head: Normocephalic and atraumatic  Right Ear: Tympanic membrane, ear canal and external ear normal  There is no impacted cerumen  Left Ear: Tympanic membrane, ear canal and external ear normal  There is no impacted cerumen  Nose: Nose normal  No congestion or rhinorrhea  Mouth/Throat:      Mouth: Mucous membranes are moist       Pharynx: Oropharynx is clear   No oropharyngeal exudate or posterior oropharyngeal erythema  Eyes:      Extraocular Movements: Extraocular movements intact  Conjunctiva/sclera: Conjunctivae normal    Cardiovascular:      Rate and Rhythm: Normal rate and regular rhythm  Pulses: Normal pulses  Heart sounds: Normal heart sounds  No murmur heard  Pulmonary:      Effort: Pulmonary effort is normal  No respiratory distress  Breath sounds: Normal breath sounds  No wheezing  Abdominal:      General: Bowel sounds are normal  There is no distension  Palpations: Abdomen is soft  Tenderness: There is no abdominal tenderness  Musculoskeletal:         General: No swelling, tenderness, deformity or signs of injury  Normal range of motion  Cervical back: Normal range of motion and neck supple  Right lower leg: No edema  Left lower leg: No edema  Comments: Ambulates with walker    Lymphadenopathy:      Cervical: No cervical adenopathy  Skin:     General: Skin is warm and dry  Capillary Refill: Capillary refill takes less than 2 seconds  Findings: No lesion or rash  Neurological:      General: No focal deficit present  Mental Status: She is alert and oriented to person, place, and time  Psychiatric:         Mood and Affect: Mood normal          Behavior: Behavior normal          Thought Content:  Thought content normal          Judgment: Judgment normal

## 2022-07-27 DIAGNOSIS — J44.9 COPD WITHOUT EXACERBATION (HCC): Primary | ICD-10-CM

## 2022-08-11 ENCOUNTER — TELEPHONE (OUTPATIENT)
Dept: HEMATOLOGY ONCOLOGY | Facility: CLINIC | Age: 82
End: 2022-08-11

## 2022-08-11 NOTE — TELEPHONE ENCOUNTER
Appointment Confirmation (to confirm pre existing appointments - ONLY)  No need to route   Appointment with  Bubba Martinez   Appointment date & time 12/16/22 @ 9:30am   Location [place]   Patient verbilized Understanding yes

## 2022-08-15 DIAGNOSIS — K21.9 GASTROESOPHAGEAL REFLUX DISEASE, UNSPECIFIED WHETHER ESOPHAGITIS PRESENT: Primary | ICD-10-CM

## 2022-08-15 RX ORDER — FAMOTIDINE 40 MG/1
40 TABLET, FILM COATED ORAL
Qty: 30 TABLET | Refills: 1 | Status: SHIPPED | OUTPATIENT
Start: 2022-08-15 | End: 2022-09-29

## 2022-09-28 DIAGNOSIS — K21.9 GASTROESOPHAGEAL REFLUX DISEASE, UNSPECIFIED WHETHER ESOPHAGITIS PRESENT: ICD-10-CM

## 2022-09-29 RX ORDER — FAMOTIDINE 40 MG/1
TABLET, FILM COATED ORAL
Qty: 60 TABLET | Refills: 0 | Status: SHIPPED | OUTPATIENT
Start: 2022-09-29

## 2022-12-05 ENCOUNTER — TELEPHONE (OUTPATIENT)
Dept: HEMATOLOGY ONCOLOGY | Facility: CLINIC | Age: 82
End: 2022-12-05

## 2022-12-05 NOTE — TELEPHONE ENCOUNTER
Patient is calling in requesting for her labs to be mailed to her, she has provided me with the following address     [address] [facility] [address]    Labs have been mailed

## 2022-12-12 ENCOUNTER — TELEPHONE (OUTPATIENT)
Dept: HEMATOLOGY ONCOLOGY | Facility: CLINIC | Age: 82
End: 2022-12-12

## 2022-12-12 NOTE — TELEPHONE ENCOUNTER
Appointment Cancellation Or Reschedule     Person calling in Patient    If other than patient calling, are they listed on the communication consent form? N/A   Provider Elvie Mcfarland   Office Visit Date and Time 12/16/22 9:30AM   Office Visit Location [place]   Did patient want to reschedule their office appointment? If so, when was it scheduled to? Yes 01/06/23 3:30PM   Did you have STAR scheduled for this appointment? No   Do you need STAR set up for your new appointment? If yes, please send to "PATIENT RIDESHARE" pool for STAR rescheduling N/A   If you are cancelling appointment, can we notify STAR to cancel ride? If yes, please send to "PATIENT RIDESHARE" pool for STAR to cancel service N/A   Is this patient calling to reschedule an infusion appointment? No   When is their next infusion appointment? N/A   Is this patient a Chemo patient? No   Reason for Cancellation or Reschedule Scheduling conflict     If the patient is a treatment patient, please route this to the office nurse  If the patient is not on treatment, please route to the office MA  If the patient is a surgical oncology patient, please route to surg/onc clinical pool

## 2022-12-15 ENCOUNTER — TELEPHONE (OUTPATIENT)
Dept: SURGICAL ONCOLOGY | Facility: CLINIC | Age: 82
End: 2022-12-15

## 2023-01-06 ENCOUNTER — OFFICE VISIT (OUTPATIENT)
Dept: SURGICAL ONCOLOGY | Facility: CLINIC | Age: 83
End: 2023-01-06

## 2023-01-06 VITALS
HEIGHT: 62 IN | DIASTOLIC BLOOD PRESSURE: 70 MMHG | RESPIRATION RATE: 16 BRPM | WEIGHT: 155 LBS | SYSTOLIC BLOOD PRESSURE: 130 MMHG | HEART RATE: 66 BPM | BODY MASS INDEX: 28.52 KG/M2 | TEMPERATURE: 97 F | OXYGEN SATURATION: 97 %

## 2023-01-06 DIAGNOSIS — E89.2 STATUS POST PARATHYROIDECTOMY (HCC): Primary | ICD-10-CM

## 2023-01-06 RX ORDER — ALBUTEROL SULFATE 2.5 MG/3ML
SOLUTION RESPIRATORY (INHALATION)
COMMUNITY
Start: 2022-11-29

## 2023-01-06 NOTE — PROGRESS NOTES
Surgical Oncology Follow Up       [address] [facility] SURGICAL ONCOLOGY [facility] [address]    Eula Schroeder  0/88/5327  [id number]  [facility] SURGICAL ONCOLOGY [place]  [place] [address]    Chief Complaint   Patient presents with   • Follow-up       Assessment/Plan:  1  Status post parathyroidectomy ([address] )  - 1 month follow up  - need PTH       Discussion/Summary: Pt is an 78year old female presenting for a 6 month follow up s/p parathyroidectomy secondary to parathyroid adenoma  Patient had labs at an outside facility and her vitamin D and calcium were within normal limits  Her PTH was not obtained for some reason  I informed her she needs to get more blood work prior to her discharge to ensure her surgery was successful  I ordered a PTH for her and urged her to get this lab as soon as she can  I will tentatively see her again in 1 month and informed her if her labs are normal she will be able to cancel  She was instructed to call with questions or concerns prior to that time  All questions were answered  History of Present Illness:     Oncology History    No history exists         -Interval History:  Pt is an 78year old female presenting for a 6 month follow up s/p parathyroidectomy secondary to parathyroid adenoma  Vitamin D and calcium were within normal limits  She is still pending PTH  Patient denies fatigue, mood disturbance, depression, muscle weakness, or nausea  Review of Systems:  Review of Systems   Constitutional: Negative for activity change, appetite change, fatigue and unexpected weight change  Respiratory: Negative for cough and shortness of breath  Cardiovascular: Negative for chest pain  Gastrointestinal: Negative for abdominal pain, diarrhea, nausea and vomiting  Endocrine: Negative for heat intolerance     Musculoskeletal: Negative for arthralgias, back pain and myalgias  Skin: Negative for rash  Neurological: Negative for weakness and headaches  Hematological: Negative for adenopathy  Patient Active Problem List   Diagnosis   • Hypercalcemia   • Hyperparathyroidism ([place] [address] )   • Encounter for geriatric assessment   • Primary hypertension   • Other hyperlipidemia   • CKD (chronic kidney disease)   • COPD without exacerbation ([place] [address] )   • Breast cancer ([place] [address] )   • Status post parathyroidectomy ([place] [address] )   • Gastroesophageal reflux disease without esophagitis   • Environmental and seasonal allergies     Past Medical History:   Diagnosis Date   • Arthritis    • Breast cancer ([place] [address] )     right, did XRT and surgery and took 5 years of estrogen blocker   • Cancer ([place] [address] )    • Chronic kidney disease    • CKD (chronic kidney disease) stage 3, GFR 30-59 ml/min ([facility])    • Colon polyps    • COPD (chronic obstructive pulmonary disease) ([facility])    • Fatty liver    • GERD (gastroesophageal reflux disease)    • Hyperlipidemia    • Hypertension    • Lymphedema of arm     right   • Osteoporosis     was on prolia and now off it per Dr Shayna Winters     • Pneumonia      Past Surgical History:   Procedure Laterality Date   • BREAST LUMPECTOMY Right     with AND   • COLONOSCOPY     • PARTIAL HYSTERECTOMY     • AL PARATHYROIDECTOMY/EXPLORATION PARATHYROIDS Left 5/24/2022    Procedure: MINIMALLY INVASIVE LEFT PARATHYROIDECTOMY, POSSIBLE 4 GLAND EXPLORATION, INTRAOPERATIVE PTH MONITORING;  Surgeon: Alfonso Lagos MD;  Location[facility];  Service: Surgical Oncology     Family History   Problem Relation Age of Onset   • Heart disease Mother    • Stroke Mother    • Hypertension Mother    • Colon cancer Father    • Diabetes type II Sister    • Hypertension Sister    • Hyperlipidemia Sister    • Heart disease Brother    • Heart disease Daughter    • No Known Problems Son    • No Known Problems Son    • No Known Problems Son    • No Known Problems Son    • No Known Problems Brother    • Mental retardation Brother      Social History     Socioeconomic History   • Marital status:       Spouse name: Not on file   • Number of children: Not on file   • Years of education: Not on file   • Highest education level: Not on file   Occupational History   • Not on file   Tobacco Use   • Smoking status: Former     Types: Cigarettes     Quit date:      Years since quittin 0   • Smokeless tobacco: Never   Vaping Use   • Vaping Use: Never used   Substance and Sexual Activity   • Alcohol use: Never   • Drug use: Never   • Sexual activity: Not on file   Other Topics Concern   • Not on file   Social History Narrative   • Not on file     Social Determinants of Health     Financial Resource Strain: Not on file   Food Insecurity: Not on file   Transportation Needs: Not on file   Physical Activity: Not on file   Stress: Not on file   Social Connections: Not on file   Intimate Partner Violence: Not At Risk   • Fear of Current or Ex-Partner: No   • Emotionally Abused: No   • Physically Abused: No   • Sexually Abused: No   Housing Stability: Not on file       Current Outpatient Medications:   •  albuterol (2 5 mg/3 mL) 0 083 % nebulizer solution, inhale contents of 1 vial ( 3 milliliters ) in nebulizer by mouth and INTO THE LUNGS every 6 hours, Disp: , Rfl:   •  albuterol (PROVENTIL HFA,VENTOLIN HFA) 90 mcg/act inhaler, Inhale 2 puffs every 6 (six) hours as needed for wheezing, Disp: , Rfl:   •  atorvastatin (LIPITOR) 40 mg tablet, Take 40 mg by mouth daily , Disp: , Rfl:   •  b complex vitamins capsule, Take 1 capsule by mouth daily, Disp: , Rfl:   •  Breo Ellipta 200-25 MCG/INH inhaler, Inhale 1 puff daily, Disp: 60 blister, Rfl: 3  •  esomeprazole (NexIUM) 40 MG capsule, Take 40 mg by mouth daily , Disp: , Rfl:   •  famotidine (PEPCID) 40 MG tablet, TAKE 1 TABLET AT BEDTIME, Disp: 60 tablet, Rfl: 0  •  losartan-hydrochlorothiazide (HYZAAR) 100-12 5 MG per tablet, Take 1 tablet by mouth daily , Disp: , Rfl:   Allergies Allergen Reactions   • Pollen Extract Other (See Comments)     Vitals:    01/06/23 1512   BP: 130/70   Pulse: 66   Resp: 16   Temp: (!) 97 °F (36 1 °C)   SpO2: 97%       Physical Exam  Constitutional:       General: She is not in acute distress  Appearance: Normal appearance  Neck:      Thyroid: No thyroid mass  Cardiovascular:      Rate and Rhythm: Normal rate and regular rhythm  Pulses: Normal pulses  Heart sounds: Normal heart sounds  Pulmonary:      Effort: Pulmonary effort is normal       Breath sounds: Normal breath sounds  Chest:      Chest wall: No mass  Breasts:     Right: No swelling, bleeding, inverted nipple, mass, nipple discharge, skin change or tenderness  Left: No swelling, bleeding, inverted nipple, mass, nipple discharge, skin change or tenderness  Abdominal:      General: Abdomen is flat  Palpations: Abdomen is soft  Musculoskeletal:      Cervical back: No edema or erythema  Lymphadenopathy:      Upper Body:      Right upper body: No supraclavicular, axillary or pectoral adenopathy  Left upper body: No supraclavicular, axillary or pectoral adenopathy  Skin:     General: Skin is warm  Neurological:      General: No focal deficit present  Mental Status: She is alert and oriented to person, place, and time  Psychiatric:         Mood and Affect: Mood normal          Behavior: Behavior normal            Results:    Imaging  No results found  I reviewed the above imaging data  Advance Care Planning/Advance Directives:  Discussed disease status, cancer treatment plans and/or cancer treatment goals with the patient

## 2023-01-11 ENCOUNTER — APPOINTMENT (OUTPATIENT)
Dept: LAB | Facility: CLINIC | Age: 83
End: 2023-01-11

## 2023-01-11 ENCOUNTER — TELEPHONE (OUTPATIENT)
Dept: SURGICAL ONCOLOGY | Facility: CLINIC | Age: 83
End: 2023-01-11

## 2023-01-11 ENCOUNTER — TELEPHONE (OUTPATIENT)
Dept: HEMATOLOGY ONCOLOGY | Facility: CLINIC | Age: 83
End: 2023-01-11

## 2023-01-11 DIAGNOSIS — E21.3 HYPERPARATHYROIDISM (HCC): ICD-10-CM

## 2023-01-11 DIAGNOSIS — E21.3 HYPERPARATHYROIDISM (HCC): Primary | ICD-10-CM

## 2023-01-11 LAB — PTH-INTACT SERPL-MCNC: 91.8 PG/ML (ref 18.4–80.1)

## 2023-01-11 NOTE — TELEPHONE ENCOUNTER
CALL RETURN FORM   Reason for patient call? Patient would like to know if Anatoliy Wayland has received her lab results    Patient's primary oncologist? Anatoliy Schmidt   Name of person the patient was calling for? Anatoliy Schmidt   Any additional information to add, if applicable? N/A   Informed patient that the message will be forwarded to the team and someone will get back to them as soon as possible    Did you relay this information to the patient?  Yes

## 2023-01-12 ENCOUNTER — TELEPHONE (OUTPATIENT)
Dept: SURGICAL ONCOLOGY | Facility: CLINIC | Age: 83
End: 2023-01-12

## 2023-01-12 DIAGNOSIS — E21.3 HYPERPARATHYROIDISM (HCC): Primary | ICD-10-CM

## 2023-01-12 NOTE — TELEPHONE ENCOUNTER
Spoke to patient about elevated PTH  Albino Acevedo would like to see her again in 6 months for a follow up and repeat calcium and pth  Orders were placed

## 2023-05-04 ENCOUNTER — TELEPHONE (OUTPATIENT)
Dept: SURGICAL ONCOLOGY | Facility: CLINIC | Age: 83
End: 2023-05-04

## 2023-05-04 NOTE — TELEPHONE ENCOUNTER
Patient called in to confirm new appointment with Jt Draper on 7/7/23 at 9:15am at [facility] office  She was agreeable

## 2023-06-22 ENCOUNTER — TELEPHONE (OUTPATIENT)
Dept: SURGICAL ONCOLOGY | Facility: CLINIC | Age: 83
End: 2023-06-22

## 2023-06-22 DIAGNOSIS — E89.2 STATUS POST PARATHYROIDECTOMY (HCC): Primary | ICD-10-CM

## 2023-06-22 NOTE — TELEPHONE ENCOUNTER
Tried to [facility] for patient but no answering machine  LM on grandson's number, letting him know that we are to see patient on Friday, July 7 at 9:15am, and that she needs blood work done beforehand at any [facility] lab  Teams number given

## 2023-06-27 ENCOUNTER — TELEPHONE (OUTPATIENT)
Dept: SURGICAL ONCOLOGY | Facility: CLINIC | Age: 83
End: 2023-06-27

## 2023-07-03 ENCOUNTER — TELEPHONE (OUTPATIENT)
Dept: HEMATOLOGY ONCOLOGY | Facility: CLINIC | Age: 83
End: 2023-07-03

## 2023-07-03 NOTE — TELEPHONE ENCOUNTER
Lab Inquiry   Who are you speaking with? self     If it is not the patient, are they listed on an active communication consent form? self   Name of ordering provider Judy Cullen   What is being requested? Updated lab orders MAILED to patient   Lab draw location Lenka Speaks   What is the best call back number?  [phone]

## 2023-07-10 ENCOUNTER — TELEPHONE (OUTPATIENT)
Dept: HEMATOLOGY ONCOLOGY | Facility: CLINIC | Age: 83
End: 2023-07-10

## 2023-07-10 NOTE — TELEPHONE ENCOUNTER
Call Transfer   Who are you speaking with? Patient   If it is not the patient, are they listed on an active communication consent form? N/A   Who is the patients HemOnc/SurgOnc provider? Dr. Jhon Milan   What is the reason for this call? Patient returning call to [facility]   Person/Department that the call was transferred to? Time that call was transferred? [facility] @ 8:49AM   Your call will be transferred now. If you receive a voicemail, please leave a detailed message and a member of the team will return your call as soon as possible. Did you relay this information to the caller?   Yes

## 2023-07-10 NOTE — TELEPHONE ENCOUNTER
Spoke to patient and answered all questions concerning her blood work needed before her appt on 8/18. Patient verbalized understanding and will go to a [facility] in the next couple weeks.

## 2023-07-17 ENCOUNTER — TELEPHONE (OUTPATIENT)
Dept: HEMATOLOGY ONCOLOGY | Facility: CLINIC | Age: 83
End: 2023-07-17

## 2023-07-17 NOTE — TELEPHONE ENCOUNTER
Patient Call    Who are you speaking with? Patient    If it is not the patient, are they listed on an active communication consent form? N/A   What is the reason for this call? Patient calling to confirm whether or not her labs are fasting. Confirmed that the labs ordered by Dr. Iris Butler are non fasting   Does this require a call back? No   If a call back is required, please list best call back number N.A   If a call back is required, advise that a message will be forwarded to their care team and someone will return their call as soon as possible. Did you relay this information to the patient?  N/A

## 2023-08-04 ENCOUNTER — APPOINTMENT (OUTPATIENT)
Dept: LAB | Facility: CLINIC | Age: 83
End: 2023-08-04
Payer: COMMERCIAL

## 2023-08-04 DIAGNOSIS — E89.2 STATUS POST PARATHYROIDECTOMY (HCC): ICD-10-CM

## 2023-08-04 DIAGNOSIS — E21.3 HYPERPARATHYROIDISM (HCC): ICD-10-CM

## 2023-08-04 LAB
CALCIUM SERPL-MCNC: 8.4 MG/DL (ref 8.3–10.1)
PTH-INTACT SERPL-MCNC: 77.3 PG/ML (ref 12–88)

## 2023-08-04 PROCEDURE — 83970 ASSAY OF PARATHORMONE: CPT

## 2023-08-04 PROCEDURE — 82310 ASSAY OF CALCIUM: CPT

## 2023-08-04 PROCEDURE — 82306 VITAMIN D 25 HYDROXY: CPT

## 2023-08-04 PROCEDURE — 36415 COLL VENOUS BLD VENIPUNCTURE: CPT

## 2023-08-11 LAB
25(OH)D2 SERPL-MCNC: <1 NG/ML
25(OH)D3 SERPL-MCNC: 41 NG/ML
25(OH)D3+25(OH)D2 SERPL-MCNC: 41 NG/ML

## 2023-08-18 ENCOUNTER — OFFICE VISIT (OUTPATIENT)
Dept: SURGICAL ONCOLOGY | Facility: CLINIC | Age: 83
End: 2023-08-18
Payer: COMMERCIAL

## 2023-08-18 VITALS
SYSTOLIC BLOOD PRESSURE: 190 MMHG | WEIGHT: 147.4 LBS | HEART RATE: 65 BPM | DIASTOLIC BLOOD PRESSURE: 82 MMHG | HEIGHT: 62 IN | TEMPERATURE: 97.4 F | RESPIRATION RATE: 16 BRPM | OXYGEN SATURATION: 95 % | BODY MASS INDEX: 27.12 KG/M2

## 2023-08-18 DIAGNOSIS — E89.2 STATUS POST PARATHYROIDECTOMY (HCC): Primary | ICD-10-CM

## 2023-08-18 PROCEDURE — 99212 OFFICE O/P EST SF 10 MIN: CPT | Performed by: SURGERY

## 2023-08-18 RX ORDER — MELATONIN
1000 DAILY
COMMUNITY

## 2023-08-18 NOTE — PROGRESS NOTES
Surgical Oncology Follow Up       [facility] SURGICAL ONCOLOGY [place]  [address] [address]    Narinder Speaker  [birth year]  [id number]  [address] [facility] SURGICAL ONCOLOGY [place]  [address] [id number]    No chief complaint on file. Assessment/Plan:    No problem-specific Assessment & Plan notes found for this encounter. Diagnoses and all orders for this visit:    Status post parathyroidectomy [facility])        Advance Care Planning/Advance Directives:  Discussed disease status, cancer treatment plans and/or cancer treatment goals with the patient. Oncology History    No history exists. History of Present Illness: 78-year-old woman status post parathyroidectomy here for 6-month follow-up,   -Interval History: She had blood work recently anticipation of today's visit. She has no complaints report and is doing well. Review of Systems:  Review of Systems   Constitutional: Negative. HENT: Negative. Eyes: Negative. Respiratory: Negative. Cardiovascular: Negative. Gastrointestinal: Negative. Endocrine: Negative. Genitourinary: Negative. Musculoskeletal: Negative. Skin: Negative. Allergic/Immunologic: Negative. Neurological: Negative. Hematological: Negative. Psychiatric/Behavioral: Negative. All other systems reviewed and are negative.       Patient Active Problem List   Diagnosis   • Hypercalcemia   • Hyperparathyroidism ([address])   • Encounter for geriatric assessment   • Primary hypertension   • Other hyperlipidemia   • CKD (chronic kidney disease)   • COPD without exacerbation ([address])   • Breast cancer ([address])   • Status post parathyroidectomy ([address])   • Gastroesophageal reflux disease without esophagitis   • Environmental and seasonal allergies     Past Medical History:   Diagnosis Date   • Arthritis    • Breast cancer ([address])     right, did XRT and surgery and took 5 years of estrogen blocker   • Cancer [facility])    • Chronic kidney disease    • CKD (chronic kidney disease) stage 3, GFR 30-59 ml/min (HCC)    • Colon polyps    • COPD (chronic obstructive pulmonary disease) (HCC)    • Fatty liver    • GERD (gastroesophageal reflux disease)    • Hyperlipidemia    • Hypertension    • Lymphedema of arm     right   • Osteoporosis     was on prolia and now off it per Dr. Alisha Phillips. • Pneumonia      Past Surgical History:   Procedure Laterality Date   • BREAST LUMPECTOMY Right     with AND   • COLONOSCOPY     • PARTIAL HYSTERECTOMY     • MD PARATHYROIDECTOMY/EXPLORATION PARATHYROIDS Left 2022    Procedure: MINIMALLY INVASIVE LEFT PARATHYROIDECTOMY, POSSIBLE 4 GLAND EXPLORATION, INTRAOPERATIVE PTH MONITORING;  Surgeon: Lenora Goodwin MD;  Location: [facility];  Service: Surgical Oncology     Family History   Problem Relation Age of Onset   • Heart disease Mother    • Stroke Mother    • Hypertension Mother    • Colon cancer Father    • Diabetes type II Sister    • Hypertension Sister    • Hyperlipidemia Sister    • Heart disease Brother    • Heart disease Daughter    • No Known Problems Son    • No Known Problems Son    • No Known Problems Son    • No Known Problems Son    • No Known Problems Brother    • Mental retardation Brother      Social History     Socioeconomic History   • Marital status:       Spouse name: Not on file   • Number of children: Not on file   • Years of education: Not on file   • Highest education level: Not on file   Occupational History   • Not on file   Tobacco Use   • Smoking status: Former     Types: Cigarettes     Quit date:      Years since quittin.6   • Smokeless tobacco: Never   Vaping Use   • Vaping Use: Never used   Substance and Sexual Activity   • Alcohol use: Never   • Drug use: Never   • Sexual activity: Not on file   Other Topics Concern   • Not on file   Social History Narrative   • Not on file     Social Determinants of Health Financial Resource Strain: Not on file   Food Insecurity: Not on file   Transportation Needs: Not on file   Physical Activity: Not on file   Stress: Not on file   Social Connections: Not on file   Intimate Partner Violence: Not At Risk (6/30/2022)    Humiliation, Afraid, Rape, and Kick questionnaire    • Fear of Current or Ex-Partner: No    • Emotionally Abused: No    • Physically Abused: No    • Sexually Abused: No   Housing Stability: Not on file       Current Outpatient Medications:   •  albuterol (2.5 mg/3 mL) 0.083 % nebulizer solution, inhale contents of 1 vial ( 3 milliliters ) in nebulizer by mouth and INTO THE LUNGS every 6 hours, Disp: , Rfl:   •  albuterol (PROVENTIL HFA,VENTOLIN HFA) 90 mcg/act inhaler, Inhale 2 puffs every 6 (six) hours as needed for wheezing, Disp: , Rfl:   •  atorvastatin (LIPITOR) 40 mg tablet, Take 40 mg by mouth daily , Disp: , Rfl:   •  b complex vitamins capsule, Take 1 capsule by mouth daily, Disp: , Rfl:   •  Breo Ellipta 200-25 MCG/INH inhaler, Inhale 1 puff daily, Disp: 60 blister, Rfl: 3  •  esomeprazole (NexIUM) 40 MG capsule, Take 40 mg by mouth daily , Disp: , Rfl:   •  famotidine (PEPCID) 40 MG tablet, TAKE 1 TABLET AT BEDTIME, Disp: 60 tablet, Rfl: 0  •  losartan-hydrochlorothiazide (HYZAAR) 100-12.5 MG per tablet, Take 1 tablet by mouth daily , Disp: , Rfl:   Allergies   Allergen Reactions   • Pollen Extract Other (See Comments)     There were no vitals filed for this visit. Physical Exam  Vitals reviewed. Constitutional:       Appearance: Normal appearance. Neck:      Comments: Well-healed incision. Musculoskeletal:      Cervical back: Normal range of motion and neck supple. Neurological:      Mental Status: She is alert. Results:  Labs:  Lab Results   Component Value Date    PTH 77.3 08/04/2023    CALCIUM 8.4 08/04/2023         Imaging  No results found. I reviewed the above laboratory and imaging data.     Discussion/Summary: Status post left parathyroidectomy, now with normal calcium and PTH levels. Follow-up on a as needed basis.

## 2024-09-03 NOTE — TELEPHONE ENCOUNTER
Spoke to patient about needing PTH  I placed the order and informed her that she may go to the [facility] lab in [place] 
room air

## 2025-06-13 ENCOUNTER — TELEPHONE (OUTPATIENT)
Dept: GASTROENTEROLOGY | Facility: AMBULARY SURGERY CENTER | Age: 85
End: 2025-06-13

## 2025-06-13 NOTE — TELEPHONE ENCOUNTER
Spoke with pt. Only wants to Dr. Suresh. States not interested in capsule endoscopy at this time and will discuss at OV. Pt on wait list.

## 2025-06-16 NOTE — TELEPHONE ENCOUNTER
Left message for patient stating that we have some openings for Dr. Suresh on Thursday-told her to ask to be put through to the office.

## 2025-06-25 ENCOUNTER — OFFICE VISIT (OUTPATIENT)
Age: 85
End: 2025-06-25
Payer: COMMERCIAL

## 2025-06-25 VITALS
SYSTOLIC BLOOD PRESSURE: 128 MMHG | BODY MASS INDEX: 24.84 KG/M2 | DIASTOLIC BLOOD PRESSURE: 68 MMHG | WEIGHT: 135 LBS | HEIGHT: 62 IN

## 2025-06-25 DIAGNOSIS — K55.20 AVM (ARTERIOVENOUS MALFORMATION) OF COLON: Primary | ICD-10-CM

## 2025-06-25 DIAGNOSIS — D50.8 OTHER IRON DEFICIENCY ANEMIA: ICD-10-CM

## 2025-06-25 PROCEDURE — 99213 OFFICE O/P EST LOW 20 MIN: CPT | Performed by: STUDENT IN AN ORGANIZED HEALTH CARE EDUCATION/TRAINING PROGRAM

## 2025-06-25 RX ORDER — APIXABAN 5 MG/1
5 TABLET, FILM COATED ORAL 2 TIMES DAILY
COMMUNITY

## 2025-06-25 RX ORDER — METOPROLOL SUCCINATE 25 MG/1
1 TABLET, EXTENDED RELEASE ORAL DAILY
COMMUNITY
Start: 2025-05-22

## 2025-06-25 RX ORDER — METFORMIN HYDROCHLORIDE 500 MG/1
500 TABLET, EXTENDED RELEASE ORAL
COMMUNITY
Start: 2025-03-20

## 2025-06-25 RX ORDER — FUROSEMIDE 40 MG/1
TABLET ORAL DAILY
COMMUNITY
Start: 2025-04-25

## 2025-06-25 RX ORDER — AMLODIPINE BESYLATE 5 MG/1
5 TABLET ORAL EVERY EVENING
COMMUNITY
Start: 2025-03-28

## 2025-06-25 RX ORDER — FERROUS SULFATE 325(65) MG
1 TABLET ORAL DAILY
COMMUNITY
Start: 2025-05-21

## 2025-06-25 NOTE — PROGRESS NOTES
"Name: Erlinda Krueger      : 1940      MRN: [id number]  Encounter Provider: Td Suresh DO  Encounter Date: 2025   Encounter department: [facility] GASTROENTEROLOGY SPECIALIST [place]  :  Assessment & Plan  AVM (arteriovenous malformation) of colon  Nonbleeding cecal AVM noted at colonoscopy, not treated due to poor prep and risk of procedural complication.  Likely has small occult blood loss from AVMs throughout her GI tract. she is not interested in any further scopes.  Treatment would be medical with iron supplementation as she is doing and monitoring as planned by her primary care doctor.  If oral iron is insufficient could transition to IV iron infusions.  Per patient wishes will defer on EGD/colonoscopy or other investigation unless overt GI blood loss or symptomatic anemia.       Other iron deficiency anemia  Continue iron supplementation,.  Monitoring of iron studies and CBC per primary care.         Assessment & Plan    History of Present Illness   Erlinda Krueger is a 84 y.o. female with past medical history of GERD, COPD, hyperparathyroidism who was recently at [place] for various issues including anemia.  She had an EGD with a [facility] back in April for indication of anemia which was unrevealing.  She was then hospitalized at [place] on  for anemia with hemoglobin 7.5 from baseline 9.8 in April.  Colonoscopy was performed on  small cecal AVMs noted which were nonbleeding.  Not treated due to poor prep and risk of procedure complication as a result.  Since then she has been on oral iron and her most recent hemoglobin looks good with a hemoglobin 9.8.  Her ferritin remains low.  She has had no overt GI blood loss, she states she feels \"great \"and has no acute symptoms.  History of Present Illness    History obtained from: patient  Review of Systems A complete review of systems is negative other than that noted above in the HPI.    Medications Ordered Prior to " "Encounter[1]   Social History[2]  Current Medications[3]  Objective   /68   Ht 5' 2\" (1.575 m)   Wt 61.2 kg (135 lb)   BMI 24.69 kg/m²   Physical Exam  General Appearance: NAD, cooperative, alert  Eyes: Anicteric  GI:  Soft, non-tender, non-distended; normal bowel sounds; no masses, no organomegaly   Rectal: Deferred  Musculoskeletal: No edema.  Skin:     No jaundice      Results    Lab Results: I personally reviewed relevant lab results.                  [1]   Current Outpatient Medications on File Prior to Visit   Medication Sig Dispense Refill    albuterol (2.5 mg/3 mL) 0.083 % nebulizer solution       albuterol (PROVENTIL HFA,VENTOLIN HFA) 90 mcg/act inhaler Inhale 2 puffs every 6 (six) hours as needed for wheezing      amLODIPine (NORVASC) 5 mg tablet Take 5 mg by mouth every evening      Breo Ellipta 200-25 MCG/INH inhaler Inhale 1 puff daily 60 blister 3    cholecalciferol (VITAMIN D3) 1,000 units tablet Take 1,000 Units by mouth in the morning.      Eliquis 5 MG Take 5 mg by mouth 2 (two) times a day      esomeprazole (NexIUM) 40 MG capsule Take 40 mg by mouth in the morning.      famotidine (PEPCID) 40 MG tablet TAKE 1 TABLET AT BEDTIME 60 tablet 0    FeroSul 325 (65 Fe) MG tablet Take 1 tablet by mouth in the morning      furosemide (LASIX) 40 mg tablet Take by mouth daily      metFORMIN (GLUCOPHAGE-XR) 500 mg 24 hr tablet Take 500 mg by mouth daily with dinner      metoprolol succinate (TOPROL-XL) 25 mg 24 hr tablet Take 1 tablet by mouth in the morning      atorvastatin (LIPITOR) 40 mg tablet Take 40 mg by mouth daily  (Patient not taking: Reported on 6/25/2025)      b complex vitamins capsule Take 1 capsule by mouth daily (Patient not taking: Reported on 8/18/2023)      losartan-hydrochlorothiazide (HYZAAR) 100-12.5 MG per tablet Take 1 tablet by mouth in the morning. (Patient not taking: Reported on 6/25/2025)       No current facility-administered medications on file prior to visit.   [2] "   Social History  Tobacco Use    Smoking status: Former     Current packs/day: 0.00     Types: Cigarettes     Quit date:      Years since quittin.4    Smokeless tobacco: Never   Vaping Use    Vaping status: Never Used   Substance and Sexual Activity    Alcohol use: Never    Drug use: Never   [3]   Current Outpatient Medications   Medication Sig Dispense Refill    albuterol (2.5 mg/3 mL) 0.083 % nebulizer solution       albuterol (PROVENTIL HFA,VENTOLIN HFA) 90 mcg/act inhaler Inhale 2 puffs every 6 (six) hours as needed for wheezing      amLODIPine (NORVASC) 5 mg tablet Take 5 mg by mouth every evening      Breo Ellipta 200-25 MCG/INH inhaler Inhale 1 puff daily 60 blister 3    cholecalciferol (VITAMIN D3) 1,000 units tablet Take 1,000 Units by mouth in the morning.      Eliquis 5 MG Take 5 mg by mouth 2 (two) times a day      esomeprazole (NexIUM) 40 MG capsule Take 40 mg by mouth in the morning.      famotidine (PEPCID) 40 MG tablet TAKE 1 TABLET AT BEDTIME 60 tablet 0    FeroSul 325 (65 Fe) MG tablet Take 1 tablet by mouth in the morning      furosemide (LASIX) 40 mg tablet Take by mouth daily      metFORMIN (GLUCOPHAGE-XR) 500 mg 24 hr tablet Take 500 mg by mouth daily with dinner      metoprolol succinate (TOPROL-XL) 25 mg 24 hr tablet Take 1 tablet by mouth in the morning      atorvastatin (LIPITOR) 40 mg tablet Take 40 mg by mouth daily  (Patient not taking: Reported on 2025)      b complex vitamins capsule Take 1 capsule by mouth daily (Patient not taking: Reported on 2023)      losartan-hydrochlorothiazide (HYZAAR) 100-12.5 MG per tablet Take 1 tablet by mouth in the morning. (Patient not taking: Reported on 2025)       No current facility-administered medications for this visit.

## (undated) DEVICE — PACK UNIVERSAL NECK

## (undated) DEVICE — SUT VICRYL 4-0 PS-2 27 IN J426H

## (undated) DEVICE — 3M™ STERI-STRIP™ REINFORCED ADHESIVE SKIN CLOSURES, R1547, 1/2 IN X 4 IN (12 MM X 100 MM), 6 STRIPS/ENVELOPE: Brand: 3M™ STERI-STRIP™

## (undated) DEVICE — PLUMEPEN PRO 10FT

## (undated) DEVICE — SUT VICRYL 3-0 SH 27 IN J416H

## (undated) DEVICE — LIGACLIP MCA MULTIPLE CLIP APPLIERS, 20 SMALL CLIPS: Brand: LIGACLIP

## (undated) DEVICE — GLOVE SRG BIOGEL ECLIPSE 7

## (undated) DEVICE — 3000CC GUARDIAN II: Brand: GUARDIAN

## (undated) DEVICE — SURGICEL 4 X 8

## (undated) DEVICE — GLOVE INDICATOR PI UNDERGLOVE SZ 7 BLUE

## (undated) DEVICE — 3M™ STERI-STRIP™ COMPOUND BENZOIN TINCTURE 40 BAGS/CARTON 4 CARTONS/CASE C1544: Brand: 3M™ STERI-STRIP™

## (undated) DEVICE — DRAPE SURGIKIT SADDLE BAG

## (undated) DEVICE — SUT MONOCRYL 5-0 P-3 18 IN Y493G

## (undated) DEVICE — MINOR PROCEDURE DRAPE: Brand: CONVERTORS